# Patient Record
Sex: MALE | Race: WHITE | NOT HISPANIC OR LATINO | Employment: UNEMPLOYED | URBAN - METROPOLITAN AREA
[De-identification: names, ages, dates, MRNs, and addresses within clinical notes are randomized per-mention and may not be internally consistent; named-entity substitution may affect disease eponyms.]

---

## 2018-03-08 ENCOUNTER — APPOINTMENT (EMERGENCY)
Dept: RADIOLOGY | Facility: HOSPITAL | Age: 28
End: 2018-03-08
Payer: COMMERCIAL

## 2018-03-08 ENCOUNTER — HOSPITAL ENCOUNTER (EMERGENCY)
Facility: HOSPITAL | Age: 28
Discharge: HOME | End: 2018-03-08
Attending: EMERGENCY MEDICINE | Admitting: EMERGENCY MEDICINE
Payer: COMMERCIAL

## 2018-03-08 VITALS
TEMPERATURE: 97.5 F | BODY MASS INDEX: 26.61 KG/M2 | HEART RATE: 52 BPM | DIASTOLIC BLOOD PRESSURE: 70 MMHG | OXYGEN SATURATION: 99 % | HEIGHT: 70 IN | RESPIRATION RATE: 21 BRPM | WEIGHT: 185.85 LBS | SYSTOLIC BLOOD PRESSURE: 120 MMHG

## 2018-03-08 DIAGNOSIS — R06.00 DYSPNEA: ICD-10-CM

## 2018-03-08 DIAGNOSIS — R07.9 CHEST PAIN: Primary | ICD-10-CM

## 2018-03-08 LAB
ALBUMIN SERPL BCP-MCNC: 4.1 G/DL (ref 3.5–5)
ALP SERPL-CCNC: 63 U/L (ref 46–116)
ALT SERPL W P-5'-P-CCNC: 19 U/L (ref 12–78)
ANION GAP SERPL CALCULATED.3IONS-SCNC: 9 MMOL/L (ref 4–13)
AST SERPL W P-5'-P-CCNC: 10 U/L (ref 5–45)
ATRIAL RATE: 57 BPM
BASOPHILS # BLD AUTO: 0.04 THOUSANDS/ΜL (ref 0–0.1)
BASOPHILS NFR BLD AUTO: 1 % (ref 0–1)
BILIRUB SERPL-MCNC: 0.5 MG/DL (ref 0.2–1)
BUN SERPL-MCNC: 19 MG/DL (ref 5–25)
CALCIUM SERPL-MCNC: 8.8 MG/DL (ref 8.3–10.1)
CHLORIDE SERPL-SCNC: 100 MMOL/L (ref 100–108)
CO2 SERPL-SCNC: 27 MMOL/L (ref 21–32)
CREAT SERPL-MCNC: 1.23 MG/DL (ref 0.6–1.3)
DEPRECATED D DIMER PPP: <270 NG/ML (FEU) (ref 0–424)
EOSINOPHIL # BLD AUTO: 0.15 THOUSAND/ΜL (ref 0–0.61)
EOSINOPHIL NFR BLD AUTO: 2 % (ref 0–6)
ERYTHROCYTE [DISTWIDTH] IN BLOOD BY AUTOMATED COUNT: 12 % (ref 11.6–15.1)
GFR SERPL CREATININE-BSD FRML MDRD: 81 ML/MIN/1.73SQ M
GLUCOSE SERPL-MCNC: 127 MG/DL (ref 65–140)
HCT VFR BLD AUTO: 38.1 % (ref 36.5–49.3)
HGB BLD-MCNC: 13.1 G/DL (ref 12–17)
LYMPHOCYTES # BLD AUTO: 2.81 THOUSANDS/ΜL (ref 0.6–4.47)
LYMPHOCYTES NFR BLD AUTO: 42 % (ref 14–44)
MCH RBC QN AUTO: 29.2 PG (ref 26.8–34.3)
MCHC RBC AUTO-ENTMCNC: 34.4 G/DL (ref 31.4–37.4)
MCV RBC AUTO: 85 FL (ref 82–98)
MONOCYTES # BLD AUTO: 0.44 THOUSAND/ΜL (ref 0.17–1.22)
MONOCYTES NFR BLD AUTO: 7 % (ref 4–12)
NEUTROPHILS # BLD AUTO: 3.2 THOUSANDS/ΜL (ref 1.85–7.62)
NEUTS SEG NFR BLD AUTO: 48 % (ref 43–75)
NRBC BLD AUTO-RTO: 0 /100 WBCS
P AXIS: 81 DEGREES
PLATELET # BLD AUTO: 158 THOUSANDS/UL (ref 149–390)
PMV BLD AUTO: 9.7 FL (ref 8.9–12.7)
POTASSIUM SERPL-SCNC: 3.7 MMOL/L (ref 3.5–5.3)
PR INTERVAL: 158 MS
PROT SERPL-MCNC: 7.2 G/DL (ref 6.4–8.2)
QRS AXIS: 41 DEGREES
QRSD INTERVAL: 96 MS
QT INTERVAL: 412 MS
QTC INTERVAL: 401 MS
RBC # BLD AUTO: 4.49 MILLION/UL (ref 3.88–5.62)
SODIUM SERPL-SCNC: 136 MMOL/L (ref 136–145)
T WAVE AXIS: 52 DEGREES
TROPONIN I SERPL-MCNC: <0.02 NG/ML
VENTRICULAR RATE: 57 BPM
WBC # BLD AUTO: 6.65 THOUSAND/UL (ref 4.31–10.16)

## 2018-03-08 PROCEDURE — 36415 COLL VENOUS BLD VENIPUNCTURE: CPT | Performed by: EMERGENCY MEDICINE

## 2018-03-08 PROCEDURE — 85379 FIBRIN DEGRADATION QUANT: CPT | Performed by: EMERGENCY MEDICINE

## 2018-03-08 PROCEDURE — 85025 COMPLETE CBC W/AUTO DIFF WBC: CPT | Performed by: EMERGENCY MEDICINE

## 2018-03-08 PROCEDURE — 93005 ELECTROCARDIOGRAM TRACING: CPT | Performed by: EMERGENCY MEDICINE

## 2018-03-08 PROCEDURE — 80053 COMPREHEN METABOLIC PANEL: CPT | Performed by: EMERGENCY MEDICINE

## 2018-03-08 PROCEDURE — 99285 EMERGENCY DEPT VISIT HI MDM: CPT

## 2018-03-08 PROCEDURE — 96374 THER/PROPH/DIAG INJ IV PUSH: CPT

## 2018-03-08 PROCEDURE — 71046 X-RAY EXAM CHEST 2 VIEWS: CPT

## 2018-03-08 PROCEDURE — 93010 ELECTROCARDIOGRAM REPORT: CPT | Performed by: INTERNAL MEDICINE

## 2018-03-08 PROCEDURE — 84484 ASSAY OF TROPONIN QUANT: CPT | Performed by: EMERGENCY MEDICINE

## 2018-03-08 RX ORDER — 0.9 % SODIUM CHLORIDE 0.9 %
3 VIAL (ML) INJECTION AS NEEDED
Status: DISCONTINUED | OUTPATIENT
Start: 2018-03-08 | End: 2018-03-08 | Stop reason: HOSPADM

## 2018-03-08 RX ORDER — KETOROLAC TROMETHAMINE 30 MG/ML
15 INJECTION, SOLUTION INTRAMUSCULAR; INTRAVENOUS ONCE
Status: COMPLETED | OUTPATIENT
Start: 2018-03-08 | End: 2018-03-08

## 2018-03-08 RX ADMIN — KETOROLAC TROMETHAMINE 15 MG: 30 INJECTION, SOLUTION INTRAMUSCULAR at 03:15

## 2018-03-08 NOTE — DISCHARGE INSTRUCTIONS
Chest Pain   WHAT YOU NEED TO KNOW:   Chest pain can be caused by a range of conditions, from not serious to life-threatening  Chest pain can be a symptom of a digestive problem, such as acid reflux or a stomach ulcer  An anxiety attack or a strong emotion, such as anger, can also cause chest pain  Infection, inflammation, or a fracture in the bones or cartilage in your chest can cause pain or discomfort  Sometimes chest pain or pressure is caused by poor blood flow to your heart (angina)  Chest pain may also be caused by life-threatening conditions such as a heart attack or blood clot in your lungs  DISCHARGE INSTRUCTIONS:   Call 911 if:   · You have any of the following signs of a heart attack:      ¨ Squeezing, pressure, or pain in your chest that lasts longer than 5 minutes or returns    ¨ Discomfort or pain in your back, neck, jaw, stomach, or arm     ¨ Trouble breathing    ¨ Nausea or vomiting    ¨ Lightheadedness or a sudden cold sweat, especially with chest pain or trouble breathing    Return to the emergency department if:   · You have chest discomfort that gets worse, even with medicine  · You cough or vomit blood  · Your bowel movements are black or bloody  · You cannot stop vomiting, or it hurts to swallow  Contact your healthcare provider if:   · You have questions or concerns about your condition or care  Medicines:   · Medicines  may be given to treat the cause of your chest pain  Examples include pain medicine, anxiety medicine, or medicines to increase blood flow to your heart  · Do not take certain medicines without asking your healthcare provider first   These include NSAIDs, herbal or vitamin supplements, or hormones (estrogen or progestin)  · Take your medicine as directed  Contact your healthcare provider if you think your medicine is not helping or if you have side effects  Tell him or her if you are allergic to any medicine   Keep a list of the medicines, vitamins, and herbs you take  Include the amounts, and when and why you take them  Bring the list or the pill bottles to follow-up visits  Carry your medicine list with you in case of an emergency  Follow up with your healthcare provider within 72 hours, or as directed: You may need to return for more tests to find the cause of your chest pain  You may be referred to a specialist, such as a cardiologist or gastroenterologist  Write down your questions so you remember to ask them during your visits  Healthy living tips: The following are general healthy guidelines  If your chest pain is caused by a heart problem, your healthcare provider will give you specific guidelines to follow  · Do not smoke  Nicotine and other chemicals in cigarettes and cigars can cause lung and heart damage  Ask your healthcare provider for information if you currently smoke and need help to quit  E-cigarettes or smokeless tobacco still contain nicotine  Talk to your healthcare provider before you use these products  · Eat a variety of healthy, low-fat foods  Healthy foods include fruits, vegetables, whole-grain breads, low-fat dairy products, beans, lean meats, and fish  Ask for more information about a heart healthy diet  · Ask about activity  Your healthcare provider will tell you which activities to limit or avoid  Ask when you can drive, return to work, and have sex  Ask about the best exercise plan for you  · Maintain a healthy weight  Ask your healthcare provider how much you should weigh  Ask him or her to help you create a weight loss plan if you are overweight  · Get the flu and pneumonia vaccines  All adults should get the influenza (flu) vaccine  Get it every year as soon as it becomes available  The pneumococcal vaccine is given to adults aged 72 years or older  The vaccine is given every 5 years to prevent pneumococcal disease, such as pneumonia    © 2017 [facility] [place] Information is for End User's use only and may not be sold, redistributed or otherwise used for commercial purposes  All illustrations and images included in CareNotes® are the copyrighted property of [employer]  or [facility]  The above information is an  only  It is not intended as medical advice for individual conditions or treatments  Talk to your doctor, nurse or pharmacist before following any medical regimen to see if it is safe and effective for you

## 2018-03-08 NOTE — ED PROVIDER NOTES
History  Chief Complaint   Patient presents with    Shortness of Breath     Per PT " I woke with SOB with CP  The CP felt tight for awhile " No Hx of cardiac and respiratory  HPI  33 y o  male presents with 3 hours of left sided chest pain without radiation  Patient describes severe tightness that came on while attempting to sleep and continues in the ER though it has improved  Patient states breathing worsens the pain and nothing improves the pain  Patient denies exertional component to his chest pain  Patient has had multiple prior episodes of this in the past about evaluate without clear etiology identified  Patient states he met with a cardiologist few years ago who suggested that the chest pain was likely related to anxiety  Patient denies fever/chills, nausea/vomiting, diaphoresis, dyspnea, cough, hemoptysis, weakness, dizziness, back pain, syncope, focal weakness or numbness, leg pain or swelling  All other review of systems reviewed and noted to be negative  The patient denies a history of atherosclerotic disease (CAD/TIA/CVA/PAD)  Patient denies any history of hypertension, denies hyperlipidemia, denies diabetes; denies any early family history of CAD (male less than 51yo or female less than 71 yo)  The patient denies any use of tobacco in the past 90 days  The patient denies any use of illicit drugs, including cocaine  Patient denies any immobilization of at least 3 days or surgery in the past 4 weeks  Patient drive long distances, 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  Patient denies any history of DVT or PE  Patient denies any malignancy with treatment within the past 6 months  Objective  PHYSICAL EXAM:  Constitutional:  No acute distress  Eyes: No scleral icterus or erythema  HENT:  Head normocephalic and atraumatic  Pharynx moist without erythema or exudate  CV:  Normal inspection with no rash, signs of infection, or trauma  Regular rate and rhythm   No murmur  Peripheral pulses intact and equal   Respiratory:  Lungs clear to auscultation bilaterally without adventitious sounds  Abdomen:  Soft, non-tender, non-distended  Back:  No rash or signs of herpes zoster  Skin:  Normal color  Warm and Dry  Extremities:  Non-tender lower extremities without asymmetry; no clinical signs of DVT  No lower extremity edema  Neuro:  Alert  No gross motor deficits  Psych: Normal mood and affect  Medical Decision Making    HEART SCORE  History: 0   -Highly suspicious +2, Moderately suspicious +1   -Likelihood ratios: radiation to both arms (2 6), similar to prior ischemia (2 2), change in pattern over 24 hours (2 0)  EK (ST changes +2, Other abnormalities +1)  Age: 0 (greater 65 +2, 45-65 +1)  Risk factors: 0 (3 or more +2, 1-2 factors +1)  Troponin: 0 (greater than 3 times limit +2)  Total: 0    Patient presenting with chest pain  The patient's HEART score is 0  EKG obtained and reviewed independently by myself, which was sinus vini without any acute ST/T wave changes  CXR will be obtained to evaluate for alternative pathologies, including pneumothorax or pneumonia  CBC will be obtained to evaluate for leukocytosis suggestive of infectious process such as pneumonia or myocarditis  Rod Claros' Criteria for Pulmonary Embolism  no - clinical signs or symptoms of DVT (3)  no - PE most likely diagnosis (3)  no - Heart rate greater than 100 (1 5)  no - Immobilization at least 3 days OR surgery in the previous 4 weeks (1 5)  no - Previous, objectively diagnosed PE or DVT (1 5)  no - Hemoptysis (1)  no - Malignancy with treatment within 6 months or palliative (1)    Patient has a Wells' score of greater than 4 points, imaging will be obtained with a CT of the chest with PE protocol to evaluate for potential pulmonary embolism  Patient has a low risk Wells' criteria but at risk considering patient's routine of driving greater than 3 hours at a time    As such, a D-Dimer will be ordered for the patient to evaluate for the potential for pulmonary embolism  If positive, CT imaging of the patient's chest will be obtained to evaluate for potential pulmonary embolism  CXR obtained and reviewed independently by myself; this did not demonstrate any acute abnormalities  Labs obtained and reviewed and were essentially unremarkable  Initial troponin was negative  Patient declined to stay for delta troponin  Laboratory and radiographic findings were discussed with the patient  There is a broad differential and I considered emergent diagnoses including pericarditis, ACS, angina, PE, pneumonia, rib fracture, and PTX, but these appear less likely considering the data gathered thus far  I have instructed the patient to return to the ER at any time if there are any new or worsening symptoms  The patient expressed understanding of and agreement with this plan  Opportunity was given for questions prior to discharge and all stated questions were answered to the patient's satisfaction  Home care instructions provided  Stress test ordered for the patient  None       History reviewed  No pertinent past medical history  History reviewed  No pertinent surgical history  History reviewed  No pertinent family history  I have reviewed and agree with the history as documented      Social History   Substance Use Topics    Smoking status: Former Smoker    Smokeless tobacco: Never Used    Alcohol use Yes      Comment: occ        Review of Systems    Physical Exam  ED Triage Vitals   Temperature Pulse Respirations Blood Pressure SpO2   03/08/18 0251 03/08/18 0251 03/08/18 0251 03/08/18 0254 03/08/18 0251   97 5 °F (36 4 °C) 67 20 119/72 99 %      Temp Source Heart Rate Source Patient Position - Orthostatic VS BP Location FiO2 (%)   03/08/18 0251 03/08/18 0251 03/08/18 0251 03/08/18 0251 --   Oral Monitor Sitting Right arm       Pain Score       03/08/18 0251       3 Orthostatic Vital Signs  Vitals:    03/08/18 0251 03/08/18 0254 03/08/18 0345   BP:  119/72    Pulse: 67  (!) 51   Patient Position - Orthostatic VS: Sitting         Physical Exam    ED Medications  Medications   sodium chloride (PF) 0 9 % injection 3 mL (not administered)   ketorolac (TORADOL) injection 15 mg (15 mg Intravenous Given 3/8/18 0315)       Diagnostic Studies  Results Reviewed     Procedure Component Value Units Date/Time    Troponin I repeat in 3 hrs [[id number]]     Lab Status:  No result Specimen:  Blood     Troponin I [[id number]]  (Normal) Collected:  03/08/18 0310    Lab Status:  Final result Specimen:  Blood from Arm, Right Updated:  03/08/18 0337     Troponin I <0 02 ng/mL     Narrative:         [employer] Chemistry analyzer 99% cutoff is > 0 04 ng/mL in network labs    o cTnI 99% cutoff is useful only when applied to patients in the clinical setting of myocardial ischemia  o cTnI 99% cutoff should be interpreted in the context of clinical history, ECG findings and possibly cardiac imaging to establish correct diagnosis  o cTnI 99% cutoff may be suggestive but clearly not indicative of a coronary event without the clinical setting of myocardial ischemia  Comprehensive metabolic panel [[id number]] Collected:  03/08/18 0310    Lab Status:  Final result Specimen:  Blood from Arm, Right Updated:  03/08/18 0334     Sodium 136 mmol/L      Potassium 3 7 mmol/L      Chloride 100 mmol/L      CO2 27 mmol/L      Anion Gap 9 mmol/L      BUN 19 mg/dL      Creatinine 1 23 mg/dL      Glucose 127 mg/dL      Calcium 8 8 mg/dL      AST 10 U/L      ALT 19 U/L      Alkaline Phosphatase 63 U/L      Total Protein 7 2 g/dL      Albumin 4 1 g/dL      Total Bilirubin 0 50 mg/dL      eGFR 81 ml/min/1 73sq m     Narrative:         National Kidney Disease Education Program recommendations are as follows:  GFR calculation is accurate only with a steady state creatinine  Chronic Kidney disease less than 60 ml/min/1 73 sq  meters  Kidney failure less than 15 ml/min/1 73 sq  meters  D-dimer, quantitative [[id number]]  (Normal) Collected:  03/08/18 0310    Lab Status:  Final result Specimen:  Blood from Arm, Right Updated:  03/08/18 0333     D-Dimer, Quant <270 ng/ml (FEU)     CBC and differential [[id number]]  (Normal) Collected:  03/08/18 0310    Lab Status:  Final result Specimen:  Blood from Arm, Right Updated:  03/08/18 0318     WBC 6 65 Thousand/uL      RBC 4 49 Million/uL      Hemoglobin 13 1 g/dL      Hematocrit 38 1 %      MCV 85 fL      MCH 29 2 pg      MCHC 34 4 g/dL      RDW 12 0 %      MPV 9 7 fL      Platelets 964 Thousands/uL      nRBC 0 /100 WBCs      Neutrophils Relative 48 %      Lymphocytes Relative 42 %      Monocytes Relative 7 %      Eosinophils Relative 2 %      Basophils Relative 1 %      Neutrophils Absolute 3 20 Thousands/µL      Lymphocytes Absolute 2 81 Thousands/µL      Monocytes Absolute 0 44 Thousand/µL      Eosinophils Absolute 0 15 Thousand/µL      Basophils Absolute 0 04 Thousands/µL                  X-ray chest 2 views   ED Interpretation by Nuha Denis MD (03/08 0356)   No acute findings  Procedures  Procedures       Phone Contacts  ED Phone Contact    ED Course  ED Course as of Mar 08 0411   Thu Mar 08, 2018   0404 Discussed findings with the patient  Suggested delta troponin  After discussion of the risks and benefits, patient declined awaiting for delta troponin  Patient appears clear and competent in making medical decisions  Patient's pain has resolved  Patient has not had stress testing previously  Will order stress echocardiogram considering patient's age  Discussed follow-up with primary care regarding this                                  MDM  CritCare Time    Disposition  Final diagnoses:   Chest pain   Dyspnea     Time reflects when diagnosis was documented in both MDM as applicable and the Disposition within this note     Time User Action Codes Description Comment 3/8/2018  4:05 AM Yaima Lux Add [R07 9] Chest pain     3/8/2018  4:05 AM Yaima Lux Add [R06 00] Dyspnea       ED Disposition     ED Disposition Condition Comment    Discharge  Evelyne Rota discharge to home/self care  Condition at discharge: Stable        Follow-up Information     Follow up With Specialties Details Why Contact Info Additional Information    Primary care physician  Schedule an appointment as soon as possible for a visit in 1 week Follow-up and reassessment, discussed results of stress testing  [facility] Emergency Department Emergency Medicine Go to If symptoms worsen [address] [place] [address] ED, [address] [place] [address], [facility]        Patient's Medications    No medications on file       Outpatient Discharge Orders  Echo stress test w contrast if indicated   Standing Status: Future  Standing Exp   Date: 03/08/22         ED Provider  Electronically Signed by           Virgil Ibrahim MD  03/08/18 9313

## 2018-03-16 ENCOUNTER — HOSPITAL ENCOUNTER (OUTPATIENT)
Dept: NON INVASIVE DIAGNOSTICS | Facility: HOSPITAL | Age: 28
Discharge: HOME | End: 2018-03-16
Attending: EMERGENCY MEDICINE
Payer: COMMERCIAL

## 2018-03-16 DIAGNOSIS — R07.9 CHEST PAIN: ICD-10-CM

## 2018-03-16 LAB
CHEST PAIN STATEMENT: NORMAL
MAX DIASTOLIC BP: 50 MMHG
MAX HEART RATE: 176 BPM
MAX PREDICTED HEART RATE: 194 BPM
MAX. SYSTOLIC BP: 150 MMHG
PROTOCOL NAME: NORMAL
TARGET HR FORMULA: NORMAL
TEST INDICATION: NORMAL
TIME IN EXERCISE PHASE: NORMAL

## 2018-03-16 PROCEDURE — 93351 STRESS TTE COMPLETE: CPT | Performed by: INTERNAL MEDICINE

## 2018-03-16 PROCEDURE — 93350 STRESS TTE ONLY: CPT

## 2021-04-01 DIAGNOSIS — Z23 ENCOUNTER FOR IMMUNIZATION: ICD-10-CM

## 2021-04-06 ENCOUNTER — IMMUNIZATIONS (OUTPATIENT)
Dept: FAMILY MEDICINE CLINIC | Facility: HOSPITAL | Age: 31
End: 2021-04-06

## 2021-04-06 DIAGNOSIS — Z23 ENCOUNTER FOR IMMUNIZATION: Primary | ICD-10-CM

## 2021-04-06 PROCEDURE — 0001A SARS-COV-2 / COVID-19 MRNA VACCINE (PFIZER-BIONTECH) 30 MCG: CPT

## 2021-04-06 PROCEDURE — 91300 SARS-COV-2 / COVID-19 MRNA VACCINE (PFIZER-BIONTECH) 30 MCG: CPT

## 2021-04-27 ENCOUNTER — IMMUNIZATIONS (OUTPATIENT)
Dept: FAMILY MEDICINE CLINIC | Facility: HOSPITAL | Age: 31
End: 2021-04-27

## 2021-04-27 DIAGNOSIS — Z23 ENCOUNTER FOR IMMUNIZATION: Primary | ICD-10-CM

## 2021-04-27 PROCEDURE — 0002A SARS-COV-2 / COVID-19 MRNA VACCINE (PFIZER-BIONTECH) 30 MCG: CPT

## 2021-04-27 PROCEDURE — 91300 SARS-COV-2 / COVID-19 MRNA VACCINE (PFIZER-BIONTECH) 30 MCG: CPT

## 2022-03-07 ENCOUNTER — OFFICE VISIT (OUTPATIENT)
Dept: URGENT CARE | Age: 32
End: 2022-03-07
Payer: COMMERCIAL

## 2022-03-07 VITALS
OXYGEN SATURATION: 98 % | SYSTOLIC BLOOD PRESSURE: 131 MMHG | DIASTOLIC BLOOD PRESSURE: 78 MMHG | RESPIRATION RATE: 18 BRPM | HEART RATE: 78 BPM | TEMPERATURE: 97.7 F

## 2022-03-07 DIAGNOSIS — R06.02 SHORTNESS OF BREATH: ICD-10-CM

## 2022-03-07 DIAGNOSIS — R07.9 CHEST PAIN, UNSPECIFIED TYPE: Primary | ICD-10-CM

## 2022-03-07 LAB
ATRIAL RATE: 62 BPM
P AXIS: 65 DEGREES
PR INTERVAL: 146 MS
QRS AXIS: 37 DEGREES
QRSD INTERVAL: 94 MS
QT INTERVAL: 412 MS
QTC INTERVAL: 418 MS
T WAVE AXIS: 40 DEGREES
VENTRICULAR RATE: 62 BPM

## 2022-03-07 PROCEDURE — 99212 OFFICE O/P EST SF 10 MIN: CPT

## 2022-03-07 PROCEDURE — 93010 ELECTROCARDIOGRAM REPORT: CPT

## 2022-03-07 PROCEDURE — 93005 ELECTROCARDIOGRAM TRACING: CPT

## 2022-03-07 NOTE — PROGRESS NOTES
[address] [facility]        NAME: Syeda Milton is a 28 y o  male  : 1990    MRN: [id number]  DATE: 2022  TIME: 7:17 PM    Assessment and Plan   Chest pain, unspecified type [R07 9]  1  Chest pain, unspecified type     2  Shortness of breath        EKG reviewed, normal sinus rhythm noted  Recommended to patient that he go to the ER for further workup and evaluation, to rule out cardiac related chest pain  Patient declined at this time  He states that he will make an appointment with his PCP  AMA form signed  All questions and concerns were addressed at this time  Patient Instructions       Follow up with PCP in 3-5 days  Proceed to  ER if symptoms worsen  Chief Complaint     Chief Complaint   Patient presents with    Chest Pain     intermittent pressure x 2 days, non radiating, -n/v, +sob    Shortness of Breath         History of Present Illness       Patient presenting for evaluation pain over the past 2 days  Patient states that the chest pain has been intermittent, but describes as pressure-like sensation in his left upper chest   He also states that he had mild shortness of breath today, and some tingling in his left upper extremity  He denies a cardiac history, but states that his uncle suffered from an MI  He denies any current treatment for his symptoms  He denies denies increased anxiety or stress  Chest Pain   Associated symptoms include numbness and shortness of breath  Pertinent negatives include no abdominal pain, back pain, cough, fever, headaches, palpitations or vomiting  Shortness of Breath  Associated symptoms include chest pain  Pertinent negatives include no abdominal pain, ear pain, fever, headaches, rash, sore throat or vomiting  Review of Systems   Review of Systems   Constitutional: Negative for chills and fever  HENT: Negative for ear pain and sore throat  Eyes: Negative for pain and visual disturbance     Respiratory: Positive for shortness of breath  Negative for cough  Cardiovascular: Positive for chest pain  Negative for palpitations  Gastrointestinal: Negative for abdominal pain and vomiting  Genitourinary: Negative for dysuria and hematuria  Musculoskeletal: Negative for arthralgias and back pain  Skin: Negative for color change and rash  Neurological: Positive for numbness  Negative for syncope and headaches  All other systems reviewed and are negative  Current Medications     No current outpatient medications on file  Current Allergies     Allergies as of 03/07/2022    (No Known Allergies)            The following portions of the patient's history were reviewed and updated as appropriate: allergies, current medications, past family history, past medical history, past social history, past surgical history and problem list      History reviewed  No pertinent past medical history  History reviewed  No pertinent surgical history  History reviewed  No pertinent family history  Medications have been verified  Objective   /78   Pulse 78   Temp 97 7 °F (36 5 °C)   Resp 18   SpO2 98%        Physical Exam     Physical Exam  Vitals and nursing note reviewed  Constitutional:       General: He is not in acute distress  Appearance: Normal appearance  He is not ill-appearing  HENT:      Head: Normocephalic and atraumatic  Right Ear: Tympanic membrane normal       Left Ear: Tympanic membrane normal       Nose: Nose normal  No congestion or rhinorrhea  Mouth/Throat:      Mouth: Mucous membranes are moist       Pharynx: Oropharynx is clear  No oropharyngeal exudate or posterior oropharyngeal erythema  Eyes:      Extraocular Movements: Extraocular movements intact  Conjunctiva/sclera: Conjunctivae normal       Pupils: Pupils are equal, round, and reactive to light  Cardiovascular:      Rate and Rhythm: Normal rate and regular rhythm  Pulses: Normal pulses        Heart sounds: Normal heart sounds  No murmur heard  No friction rub  No gallop  Pulmonary:      Effort: Pulmonary effort is normal  No respiratory distress  Breath sounds: Normal breath sounds  No stridor  No wheezing, rhonchi or rales  Abdominal:      General: Bowel sounds are normal       Palpations: Abdomen is soft  Tenderness: There is no abdominal tenderness  Musculoskeletal:         General: Normal range of motion  Cervical back: Normal range of motion and neck supple  Skin:     General: Skin is warm and dry  Capillary Refill: Capillary refill takes less than 2 seconds  Neurological:      General: No focal deficit present  Mental Status: He is alert and oriented to person, place, and time     Psychiatric:         Mood and Affect: Mood normal          Behavior: Behavior normal

## 2022-03-08 NOTE — PATIENT INSTRUCTIONS
Chest Pain   AMBULATORY CARE:   Chest pain  can be caused by a range of conditions, from not serious to life-threatening  It may be caused by a heart attack or a blood clot in your lungs  Sometimes chest pain or pressure is caused by poor blood flow to your heart (angina)  Infection, inflammation, or a fracture in the bones or cartilage in your chest can cause pain or discomfort  Chest pain can also be a symptom of a digestive problem, such as acid reflux or a stomach ulcer  An anxiety attack or a strong emotion such as anger can also cause chest pain  It is important to follow up with your healthcare provider to find the cause of your chest pain  Common symptoms you may have with chest pain:   · Fever or sweating    · Nausea or vomiting    · Shortness of breath    · Discomfort or pressure that spreads from your chest to your back, jaw, or arm    · A racing or slow heartbeat    · Feeling weak, tired, or faint    Call your local emergency number (911 in the [address] [place] [address],[address]) or have someone call if:   · You have any of the following signs of a heart attack:      ? Squeezing, pressure, or pain in your chest    ? You may  also have any of the following:     § Discomfort or pain in your back, neck, jaw, stomach, or arm    § Shortness of breath    § Nausea or vomiting    § Lightheadedness or a sudden cold sweat      Seek care immediately if:   · You have chest discomfort that gets worse, even with medicine  · You cough or vomit blood  · Your bowel movements are black or bloody  · You cannot stop vomiting, or it hurts to swallow  Call your doctor if:   · You have questions or concerns about your condition or care  Treatment for chest pain  may include medicine to treat your symptoms while your healthcare provider finds the cause of your chest pain  · Medicines  may be given to treat the cause of your chest pain   Examples include pain medicine, anxiety medicine, or medicines to increase blood flow to your heart     · Do not take certain medicines without asking your healthcare provider first   These include NSAIDs, herbal or vitamin supplements, or hormones (estrogen or progestin)  · One or more stents  may need to be placed in your heart if pain was caused by blockage  A stent is a wire mesh tube that helps hold your artery open  Healthy living tips: The following are general healthy guidelines  If the cause of your chest pain is known, your healthcare provider will give you specific guidelines to follow  · Do not smoke  Nicotine and other chemicals in cigarettes and cigars can cause lung and heart damage  Ask your healthcare provider for information if you currently smoke and need help to quit  E-cigarettes or smokeless tobacco still contain nicotine  Talk to your healthcare provider before you use these products  · Choose a variety of healthy foods as often as possible  Include fresh, frozen, or canned fruits and vegetables  Also include low-fat dairy products, fish, chicken (without skin), and lean meats  Your healthcare provider or a dietitian can help you create meal plans  You may need to avoid certain foods or drinks if your pain is caused by a digestion problem  · Lower your sodium (salt) intake  Limit foods that are high in sodium, such as canned foods, salty snacks, and cold cuts  If you add salt when you cook food, do not add more at the table  Choose low-sodium canned foods as much as possible  · Drink plenty of water every day  Water helps your body to control your temperature and blood pressure  Ask your healthcare provider how much water you should drink every day  · Ask about activity  Your healthcare provider will tell you which activities to limit or avoid  Ask when you can drive, return to work, and have sex  Ask about the best exercise plan for you  · Maintain a healthy weight  Ask your healthcare provider what a healthy weight is for you   Ask him or her to help you create a safe weight loss plan if you are overweight  · Ask about vaccines you may need  Get the influenza (flu) vaccine every year as soon as recommended, usually in September or October  You may also need a pneumococcal vaccine to prevent pneumonia  The vaccine is usually given every 5 years, starting at age 72  Your healthcare provider can tell you if should get other vaccines, and when to get them  Follow up with your healthcare provider within 72 hours, or as directed: You may need to return for more tests to find the cause of your chest pain  You may be referred to a specialist, such as a cardiologist or gastroenterologist  Write down your questions so you remember to ask them during your visits  © Copyright [employer] 2022 Information is for End User's use only and may not be sold, redistributed or otherwise used for commercial purposes  All illustrations and images included in CareNotes® are the copyrighted property of A D A M , Inc  or Krsiti Drummond   The above information is an  only  It is not intended as medical advice for individual conditions or treatments  Talk to your doctor, nurse or pharmacist before following any medical regimen to see if it is safe and effective for you

## 2022-03-09 LAB — HCV AB SER-ACNC: NEGATIVE

## 2022-05-24 ENCOUNTER — APPOINTMENT (OUTPATIENT)
Dept: LAB | Age: 32
End: 2022-05-24
Payer: COMMERCIAL

## 2022-05-24 ENCOUNTER — OFFICE VISIT (OUTPATIENT)
Dept: URGENT CARE | Age: 32
End: 2022-05-24
Payer: COMMERCIAL

## 2022-05-24 VITALS — OXYGEN SATURATION: 99 % | HEART RATE: 66 BPM | RESPIRATION RATE: 16 BRPM | TEMPERATURE: 97.8 F

## 2022-05-24 DIAGNOSIS — R05.9 COUGH: Primary | ICD-10-CM

## 2022-05-24 DIAGNOSIS — U07.1 ACUTE COVID-19: ICD-10-CM

## 2022-05-24 LAB
SARS-COV-2 AG UPPER RESP QL IA: POSITIVE
VALID CONTROL: ABNORMAL

## 2022-05-24 PROCEDURE — 87811 SARS-COV-2 COVID19 W/OPTIC: CPT | Performed by: NURSE PRACTITIONER

## 2022-05-24 PROCEDURE — 99213 OFFICE O/P EST LOW 20 MIN: CPT | Performed by: NURSE PRACTITIONER

## 2022-05-24 NOTE — PROGRESS NOTES
[facility]        NAME: Merline Currier is a 28 y o  male  : 1990    MRN: [id number]  DATE: May 24, 2022  TIME: 6:25 PM    Assessment and Plan   Cough [R05 9]  1  Cough     2  Close exposure to COVID-19 virus  Poct Covid 19 Rapid Antigen Test         Patient Instructions     Rapid covid at the clinic is positive  Stay quarantined for one day, then masking for 5 days after quarantine  Cough med OTC PRN  Follow up with PCP in 3-5 days  Proceed to  ER if symptoms worsen  Chief Complaint     Chief Complaint   Patient presents with    Cough     Cough for 4 days, wife was positive for Covid         History of Present Illness       HPI   Reports wife is positive for covid  Only symptom includes cough  Duration of symptoms x 4 days  No chronic medical conditions  Review of Systems   Review of Systems   Constitutional: Negative for chills and fever  HENT: Negative for congestion and sore throat  Respiratory: Positive for cough  Negative for chest tightness, shortness of breath and wheezing  Cardiovascular: Negative for chest pain  Gastrointestinal: Negative for diarrhea and vomiting  Neurological: Negative for headaches  Current Medications     No current outpatient medications on file  Current Allergies     Allergies as of 2022 - Reviewed 2022   Allergen Reaction Noted    Cat hair extract Itching and Eye Swelling 2022            The following portions of the patient's history were reviewed and updated as appropriate: allergies, current medications, past family history, past medical history, past social history, past surgical history and problem list      History reviewed  No pertinent past medical history  History reviewed  No pertinent surgical history  History reviewed  No pertinent family history  Medications have been verified  Objective   Pulse 66   Temp 97 8 °F (36 6 °C)   Resp 16   SpO2 99%   No LMP for male patient  Physical Exam     Physical Exam  Constitutional:       Appearance: He is not ill-appearing or diaphoretic  HENT:      Right Ear: Tympanic membrane normal       Left Ear: Tympanic membrane normal       Nose: Rhinorrhea present  Mouth/Throat:      Mouth: Mucous membranes are moist       Pharynx: No posterior oropharyngeal erythema  Cardiovascular:      Rate and Rhythm: Regular rhythm  Heart sounds: Normal heart sounds  Pulmonary:      Effort: Pulmonary effort is normal       Breath sounds: Normal breath sounds  No wheezing  Lymphadenopathy:      Cervical: No cervical adenopathy

## 2022-11-01 ENCOUNTER — APPOINTMENT (OUTPATIENT)
Dept: LAB | Facility: CLINIC | Age: 32
End: 2022-11-01

## 2022-11-01 ENCOUNTER — OFFICE VISIT (OUTPATIENT)
Dept: FAMILY MEDICINE CLINIC | Facility: CLINIC | Age: 32
End: 2022-11-01

## 2022-11-01 ENCOUNTER — TELEPHONE (OUTPATIENT)
Dept: ADMINISTRATIVE | Facility: OTHER | Age: 32
End: 2022-11-01

## 2022-11-01 VITALS
SYSTOLIC BLOOD PRESSURE: 110 MMHG | HEART RATE: 88 BPM | DIASTOLIC BLOOD PRESSURE: 60 MMHG | OXYGEN SATURATION: 100 % | WEIGHT: 195.38 LBS | TEMPERATURE: 98 F | BODY MASS INDEX: 28.94 KG/M2 | HEIGHT: 69 IN

## 2022-11-01 DIAGNOSIS — E55.9 VITAMIN D DEFICIENCY: ICD-10-CM

## 2022-11-01 DIAGNOSIS — Z76.89 ENCOUNTER TO ESTABLISH CARE: Primary | ICD-10-CM

## 2022-11-01 DIAGNOSIS — Z11.4 SCREENING FOR HIV (HUMAN IMMUNODEFICIENCY VIRUS): ICD-10-CM

## 2022-11-01 DIAGNOSIS — Z23 ENCOUNTER FOR IMMUNIZATION: ICD-10-CM

## 2022-11-01 LAB — 25(OH)D3 SERPL-MCNC: 40 NG/ML (ref 30–100)

## 2022-11-01 NOTE — PROGRESS NOTES
Name: Olman Aguilera      : 1990      MRN: [id number]  Encounter Provider: Juan uRiz DO  Encounter Date: 2022   Encounter department: [address]     1  Encounter to establish care  Assessment & Plan:  Patient is a 58-year-old male with no significant past medical history presenting to establish care  Patient has no acute concerns today  No Fam Hx of CAD, DM , HTN, will hold on ordering any screening blood work at this time  HIV screening has been ordered      2  Encounter for immunization  -     influenza vaccine, quadrivalent, 0 5 mL, preservative-free, for adult and pediatric patients 6 mos+ (AFLURIA, FLUARIX, FLULAVAL, FLUZONE)    3  Screening for HIV (human immunodeficiency virus)  -     HIV 1/2 Antigen/Antibody (4th Generation) w Reflex [facility]; Future    4  Vitamin D deficiency  Assessment & Plan:  Continue vitamin-D supplementation  Will check vitamin-D level    Orders:  -     Vitamin D 25 hydroxy; Future         Subjective      Patient presents to establish care  Has no acute complaints or concerns today  Patient does have a past medical history of chest pain in 2018 which yielded a full cardiac workup that was negative  Patient reports that he no longer has any chest pains  Patient is not a smoker and has never smoked  A  baby at home        Review of Systems   Constitutional: Negative for chills and fever  HENT: Negative for congestion  Respiratory: Negative for cough and shortness of breath  Cardiovascular: Negative for chest pain and palpitations  Gastrointestinal: Negative for abdominal pain, constipation and diarrhea  Neurological: Negative for dizziness and headaches         Current Outpatient Medications on File Prior to Visit   Medication Sig   • Cholecalciferol (VITAMIN D3 GUMMIES ADULT PO) Take 1 Units by mouth daily       Objective     /60 (BP Location: Left arm, Patient Position: Sitting, Cuff Size: Large)   Pulse 88   Temp 98 °F (36 7 °C) (Temporal)   Ht 5' 9" (1 753 m)   Wt 88 6 kg (195 lb 6 oz)   SpO2 100%   BMI 28 85 kg/m²     Physical Exam  Vitals reviewed  Constitutional:       Appearance: Normal appearance  HENT:      Head: Normocephalic and atraumatic  Right Ear: Tympanic membrane normal       Left Ear: Tympanic membrane normal       Mouth/Throat:      Mouth: Mucous membranes are moist    Eyes:      Extraocular Movements: Extraocular movements intact  Pupils: Pupils are equal, round, and reactive to light  Cardiovascular:      Rate and Rhythm: Normal rate and regular rhythm  Heart sounds: Normal heart sounds  Pulmonary:      Effort: Pulmonary effort is normal       Breath sounds: Normal breath sounds  Neurological:      General: No focal deficit present  Mental Status: He is alert and oriented to person, place, and time  Psychiatric:         Mood and Affect: Mood normal          Behavior: Behavior normal           Coty Hawk DO     BMI Counseling: Body mass index is 28 85 kg/m²  The BMI is above normal  Exercise recommendations include moderate aerobic physical activity for 150 minutes/week, vigorous aerobic physical activity for 75 minutes/week and exercising 3-5 times per week

## 2022-11-01 NOTE — TELEPHONE ENCOUNTER
----- Message from Troy Lake MA sent at 10/31/2022 12:59 PM EDT -----  Regarding: Hep C  10/31/22 12:59 PM    Hello, our patient attached above has had Hepatitis C completed/performed  Please assist in updating the patient chart by pulling the Care Everywhere (CE) document  The date of service is 3/9/2022       Thank you,  Shonda Kim [facility]

## 2022-11-01 NOTE — ASSESSMENT & PLAN NOTE
Patient is a 78-year-old male with no significant past medical history presenting to establish care  Patient has no acute concerns today  No Fam Hx of CAD, DM , HTN, will hold on ordering any screening blood work at this time  HIV screening has been ordered

## 2022-11-01 NOTE — TELEPHONE ENCOUNTER
----- Message from Vivienne Duong MA sent at 11/1/2022  9:26 AM EDT -----  Regarding: Hep C  11/01/22 9:26 AM    Hello, our patient attached above has had Hepatitis C completed/performed  Please assist in updating the patient chart by pulling the Care Everywhere (CE) document  The date of service is 3/9/22       Thank you,  Shonda Kim  [facility]

## 2022-11-01 NOTE — TELEPHONE ENCOUNTER
----- Message from Briana Simmons MA sent at 11/1/2022  9:26 AM EDT -----  Regarding: Hep C  11/01/22 9:26 AM    Hello, our patient attached above has had Hepatitis C completed/performed  Please assist in updating the patient chart by pulling the Care Everywhere (CE) document  The date of service is 3/9/22       Thank you,  Shonda Kim [facility]

## 2022-11-01 NOTE — TELEPHONE ENCOUNTER
----- Message from Bruce Knowles MA sent at 11/1/2022  9:26 AM EDT -----  Regarding: Hep C  11/01/22 9:26 AM    Hello, our patient attached above has had Hepatitis C completed/performed  Please assist in updating the patient chart by pulling the Care Everywhere (CE) document  The date of service is 3/9/22       Thank you,  Shonda Kim [facility]

## 2022-11-02 ENCOUNTER — TELEPHONE (OUTPATIENT)
Dept: FAMILY MEDICINE CLINIC | Facility: CLINIC | Age: 32
End: 2022-11-02

## 2022-11-02 LAB — HIV 1+2 AB+HIV1 P24 AG SERPL QL IA: NORMAL

## 2022-11-02 NOTE — TELEPHONE ENCOUNTER
Left a detailed message on machine informing patient and to call back if he has questions        ----- Message from Mumtaz Jaeger DO sent at 11/2/2022 12:02 PM EDT -----  HIV Neg  Vit D low normal, would recommend OTC Vit D supplements 400IU daily

## 2022-11-02 NOTE — TELEPHONE ENCOUNTER
Upon review of the In Basket request we were able to locate, review, and update the patient chart as requested for Hepatitis C   Any additional questions or concerns should be emailed to the Practice Liaisons via the appropriate education email address, please do not reply via In Basket      Thank you  Russel Escudero

## 2023-05-25 ENCOUNTER — OFFICE VISIT (OUTPATIENT)
Dept: FAMILY MEDICINE CLINIC | Facility: CLINIC | Age: 33
End: 2023-05-25

## 2023-05-25 VITALS
HEIGHT: 69 IN | BODY MASS INDEX: 29.86 KG/M2 | OXYGEN SATURATION: 98 % | DIASTOLIC BLOOD PRESSURE: 84 MMHG | SYSTOLIC BLOOD PRESSURE: 124 MMHG | WEIGHT: 201.6 LBS | HEART RATE: 84 BPM

## 2023-05-25 DIAGNOSIS — H61.23 BILATERAL IMPACTED CERUMEN: Primary | ICD-10-CM

## 2023-05-25 NOTE — PROGRESS NOTES
Answers for HPI/ROS submitted by the patient on 5/24/2023  Affected ear: left  Chronicity: new  Onset: in the past 7 days  Progression since onset: waxing and waning  Frequency: every few hours  Fever: no fever  Pain - numeric: 3/10  abdominal pain: No  ear discharge: Yes  rash: No  cough: No  headaches: No  rhinorrhea: Yes  diarrhea: No  hearing loss: Yes  sore throat: No  neck pain: No  vomiting: No    Assessment/Plan:       Problem List Items Addressed This Visit    None  Visit Diagnoses     Bilateral impacted cerumen    -  Primary          1  Bilateral impacted cerumen  Removed without complication, patient felt relief, follow up as needed and for yearly physical         Ear cerumen removal    Date/Time: 5/25/2023 9:00 AM    Performed by: Kieran Booker MD  Authorized by: Kieran Booker MD  Universal Protocol:  Timeout called at: 5/25/2023 9:30 AM   Required items: required blood products, implants, devices, and special equipment available      Procedure details:     Location:  L ear and R ear    Procedure type: irrigation with instrumentation      Instrumentation: curette      Approach:  External  Post-procedure details:     Complication:  None    Hearing quality:  Improved    Patient tolerance of procedure: Tolerated well, no immediate complications  Comments:      Cerumen removed with no complications, TM normal after procedure, no perforation      Subjective:      Patient ID: Floridalma Harrison is a 33 y o  male  HPI    44-year-old male with no significant past medical history presenting today for 2 weeks feeling specific ear is clogged worse on the left he is intermittently having trouble hearing he has a discomfort and he feels as if it is clogged  He has otherwise been feeling well he did have a physical at a [facility] in the [place] [address] but he has recently moved to this area      The following portions of the patient's history were reviewed and updated as appropriate: allergies, "current medications, past family history, past medical history, past social history, past surgical history and problem list       Current Outpatient Medications:   •  Cholecalciferol (VITAMIN D3 GUMMIES ADULT PO), Take 1 Units by mouth daily, Disp: , Rfl:      Review of Systems   Constitutional: Negative for activity change and appetite change  HENT: Positive for ear discharge, ear pain, hearing loss and rhinorrhea  Negative for sore throat  Respiratory: Negative for cough  Cardiovascular: Negative for chest pain and palpitations  Gastrointestinal: Negative for diarrhea and vomiting  Musculoskeletal: Negative for neck pain  Skin: Negative for rash  Neurological: Negative for headaches  Objective:      /84   Pulse 84   Ht 5' 9\" (1 753 m)   Wt 91 4 kg (201 lb 9 6 oz)   SpO2 98%   BMI 29 77 kg/m²          Physical Exam  Constitutional:       Appearance: Normal appearance  HENT:      Right Ear: There is impacted cerumen  Left Ear: There is impacted cerumen  Cardiovascular:      Rate and Rhythm: Normal rate and regular rhythm  Pulmonary:      Effort: Pulmonary effort is normal       Breath sounds: Normal breath sounds  Neurological:      Mental Status: He is alert             Nettie Constantino MD  "

## 2023-10-01 ENCOUNTER — OFFICE VISIT (OUTPATIENT)
Dept: URGENT CARE | Facility: MEDICAL CENTER | Age: 33
End: 2023-10-01
Payer: COMMERCIAL

## 2023-10-01 VITALS
RESPIRATION RATE: 20 BRPM | DIASTOLIC BLOOD PRESSURE: 83 MMHG | WEIGHT: 200 LBS | BODY MASS INDEX: 28.63 KG/M2 | HEIGHT: 70 IN | OXYGEN SATURATION: 96 % | TEMPERATURE: 98.5 F | HEART RATE: 64 BPM | SYSTOLIC BLOOD PRESSURE: 138 MMHG

## 2023-10-01 DIAGNOSIS — L03.012 PARONYCHIA OF LEFT RING FINGER: Primary | ICD-10-CM

## 2023-10-01 PROCEDURE — 99213 OFFICE O/P EST LOW 20 MIN: CPT | Performed by: FAMILY MEDICINE

## 2023-10-01 RX ORDER — CEPHALEXIN 500 MG/1
500 CAPSULE ORAL EVERY 12 HOURS SCHEDULED
Qty: 14 CAPSULE | Refills: 0 | Status: SHIPPED | OUTPATIENT
Start: 2023-10-01 | End: 2023-10-08

## 2023-10-01 NOTE — PATIENT INSTRUCTIONS
Patient started empirically on cephalexin 500 mg twice a day for 7 days. Also recommended he soak affected finger in warm water for 15 to 30 minutes as needed. Paronychia   WHAT YOU NEED TO KNOW:   Paronychia is an infection of your nail fold caused by bacteria or a fungus. The nail fold is the skin around your nail. Paronychia may happen suddenly and last for 6 weeks or longer. You may have paronychia on more than 1 finger or toe. DISCHARGE INSTRUCTIONS:   Return to the emergency department if:   You have severe nail pain. The inflammation spreads to your hand or arm. Call your doctor if:   Your nail becomes loose, deformed, or falls off. You have a large abscess on your nail. You have questions or concerns about your condition or care. Medicines:   Td vaccine  is a booster shot used to help prevent tetanus and diphtheria. The Td booster may be given to adolescents and adults every 10 years or for certain wounds and injuries. Antibiotics: This medicine will help fight or prevent an infection. It may be given as a pill, cream, or ointment. Steroids: This medicine will help decrease inflammation. It may be given as a pill, cream, or ointment. Antifungal medicine: This medicine helps kill fungus that may be causing your infection. It may be given as a cream or ointment. NSAIDs:  These medicines decrease pain and swelling. NSAIDs are available without a doctor's order. Ask your healthcare provider which medicine is right for you. Ask how much to take and when to take it. Take as directed. NSAIDs can cause stomach bleeding and kidney problems if not taken correctly. Take your medicine as directed. Contact your healthcare provider if you think your medicine is not helping or if you have side effects. Tell your provider if you are allergic to any medicine. Keep a list of the medicines, vitamins, and herbs you take. Include the amounts, and when and why you take them.  Bring the list or the pill bottles to follow-up visits. Carry your medicine list with you in case of an emergency. Self-care:   Soak your nail:  Soak your nail in a mixture of equal parts vinegar and water 3 or 4 times each day. This will help decrease inflammation. Apply a warm compress:  Soak a washcloth in warm water and place it on your nail. This will help decrease inflammation. Elevate:  Raise your nail above the level of your heart as often as you can. This will help decrease swelling and pain. Prop your nail on pillows or blankets to keep it elevated comfortably. Use lotion:  Apply lotion after you wash your hands. This will prevent your skin from becoming too dry. Prevent paronychia:   Avoid chemicals and allergens that may harm your skin and nails. This includes soaps, laundry detergents, and nail products. Keep your nails clean and dry. Avoid soaking your nails in water. Use cotton-lined rubber gloves or wear 2 rubber gloves if you work with food or water. The gloves will help protect your nail folds. Keep your nails short. Do not bite your nails, pick at your hangnails, suck your fingers, or wear fake nails. Bring your own nail tools when you go to the nail salon. Follow up with your doctor as directed:  Write down your questions so you remember to ask them during your visits. © Copyright [employer] 2023 Information is for End User's use only and may not be sold, redistributed or otherwise used for commercial purposes. The above information is an  only. It is not intended as medical advice for individual conditions or treatments. Talk to your doctor, nurse or pharmacist before following any medical regimen to see if it is safe and effective for you.

## 2023-10-01 NOTE — PROGRESS NOTES
[facility]        NAME: Harjinder Juarez is a 29 y.o. male  : 1990    MRN: [id number]  DATE: 2023  TIME: 12:41 PM    Assessment and Plan   Paronychia of left ring finger [L03.012]  1. Paronychia of left ring finger  cephalexin (KEFLEX) 500 mg capsule            Patient Instructions       Follow up with PCP in 3-5 days. Proceed to  ER if symptoms worsen. Chief Complaint     Chief Complaint   Patient presents with   • Hand Pain     Patient states he has had pain and swelling in his 4th finger around the nailbed         History of Present Illness       27-year-old male here today with complaint of left fourth finger redness and swelling for the last 2 weeks. He believes that he must of cut a hangnail about 2 weeks ago and is slowly become more tender to touch. He had some drainage about a week ago. However redness and swelling on the lateral aspect is only developed in the last week or so. Decided to come to urgent care to have it evaluated. Review of Systems   Review of Systems   Skin: Positive for wound. Current Medications       Current Outpatient Medications:   •  cephalexin (KEFLEX) 500 mg capsule, Take 1 capsule (500 mg total) by mouth every 12 (twelve) hours for 7 days, Disp: 14 capsule, Rfl: 0  •  Cholecalciferol (VITAMIN D3 GUMMIES ADULT PO), Take 1 Units by mouth daily, Disp: , Rfl:     Current Allergies     Allergies as of 10/01/2023 - Reviewed 10/01/2023   Allergen Reaction Noted   • Cat hair extract Itching and Eye Swelling 2022            The following portions of the patient's history were reviewed and updated as appropriate: allergies, current medications, past family history, past medical history, past social history, past surgical history and problem list.     History reviewed. No pertinent past medical history.     Past Surgical History:   Procedure Laterality Date   • TONSILLECTOMY         Family History   Problem Relation Age of Onset   • Breast cancer Maternal Grandmother          Medications have been verified. Objective   /83   Pulse 64   Temp 98.5 °F (36.9 °C)   Resp 20   Ht 5' 10" (1.778 m)   Wt 90.7 kg (200 lb)   SpO2 96%   BMI 28.70 kg/m²   No LMP for male patient. Physical Exam     Physical Exam  Skin:     Comments: Left hand left ring finger reveals erythema and induration of the mid to distal lateral aspect with no evidence of drainage appreciated tender. Findings consistent with paronychia.

## 2023-10-18 ENCOUNTER — OFFICE VISIT (OUTPATIENT)
Dept: FAMILY MEDICINE CLINIC | Facility: CLINIC | Age: 33
End: 2023-10-18

## 2023-10-18 VITALS
WEIGHT: 204 LBS | OXYGEN SATURATION: 96 % | SYSTOLIC BLOOD PRESSURE: 118 MMHG | HEART RATE: 86 BPM | DIASTOLIC BLOOD PRESSURE: 80 MMHG | RESPIRATION RATE: 12 BRPM | BODY MASS INDEX: 29.2 KG/M2 | HEIGHT: 70 IN

## 2023-10-18 DIAGNOSIS — L03.012 PARONYCHIA OF FINGER OF LEFT HAND: ICD-10-CM

## 2023-10-18 DIAGNOSIS — Z23 ENCOUNTER FOR IMMUNIZATION: Primary | ICD-10-CM

## 2023-10-18 NOTE — ASSESSMENT & PLAN NOTE
Finish course of Keflex, it is almost completely resolved. Slight redness and minimal swelling by the nail of the ring finger. Denies any pain.

## 2023-10-18 NOTE — PROGRESS NOTES
Assessment/Plan:    Paronychia of finger of left hand  Finish course of Keflex, it is almost completely resolved. Slight redness and minimal swelling by the nail of the ring finger. Denies any pain. Diagnoses and all orders for this visit:    Encounter for immunization  -     influenza vaccine, quadrivalent, 0.5 mL, preservative-free, for adult and pediatric patients 6 mos+ (AFLURIA, FLUARIX, FLULAVAL, FLUZONE)    Paronychia of finger of left hand          Subjective:      Patient ID: Douglas Fernandez is a 29 y.o. male. Patient came today for follow-up of the recent episode of paronychia, treated with Keflex. The following portions of the patient's history were reviewed and updated as appropriate: allergies, current medications, past family history, past medical history, past social history, past surgical history, and problem list.    Review of Systems   Constitutional:  Negative for chills and fever. Skin:  Negative for color change, rash and wound. Objective:      /80 (BP Location: Left arm, Patient Position: Sitting, Cuff Size: Standard)   Pulse 86   Resp 12   Ht 5' 10" (1.778 m)   Wt 92.5 kg (204 lb)   SpO2 96%   BMI 29.27 kg/m²     Allergies   Allergen Reactions    Cat Hair Extract Itching and Eye Swelling          Current Outpatient Medications:     Cholecalciferol (VITAMIN D3 GUMMIES ADULT PO), Take 1 Units by mouth daily, Disp: , Rfl:      There are no Patient Instructions on file for this visit. Physical Exam  Constitutional:       General: He is not in acute distress. Appearance: He is not ill-appearing or toxic-appearing. Musculoskeletal:         General: No tenderness. Skin:     Findings: No erythema, lesion or rash.

## 2023-10-29 ENCOUNTER — OFFICE VISIT (OUTPATIENT)
Dept: URGENT CARE | Facility: MEDICAL CENTER | Age: 33
End: 2023-10-29
Payer: COMMERCIAL

## 2023-10-29 VITALS
TEMPERATURE: 97.3 F | DIASTOLIC BLOOD PRESSURE: 76 MMHG | RESPIRATION RATE: 20 BRPM | SYSTOLIC BLOOD PRESSURE: 126 MMHG | WEIGHT: 204 LBS | BODY MASS INDEX: 29.2 KG/M2 | OXYGEN SATURATION: 97 % | HEIGHT: 70 IN | HEART RATE: 82 BPM

## 2023-10-29 DIAGNOSIS — L03.012 PARONYCHIA OF FINGER, LEFT: Primary | ICD-10-CM

## 2023-10-29 PROCEDURE — 99213 OFFICE O/P EST LOW 20 MIN: CPT | Performed by: PHYSICIAN ASSISTANT

## 2023-10-29 RX ORDER — CEPHALEXIN 500 MG/1
500 CAPSULE ORAL EVERY 8 HOURS SCHEDULED
Qty: 30 CAPSULE | Refills: 0 | Status: SHIPPED | OUTPATIENT
Start: 2023-10-29 | End: 2023-11-08

## 2023-10-29 NOTE — PROGRESS NOTES
[facility]        NAME: Yuli Richter is a 29 y.o. male  : 1990    MRN: [id number]  DATE: 2023  TIME: 9:40 AM    /76   Pulse 82   Temp (!) 97.3 °F (36.3 °C)   Resp 20   Ht 5' 10" (1.778 m)   Wt 92.5 kg (204 lb)   SpO2 97%   BMI 29.27 kg/m²     Assessment and Plan   Paronychia of finger, left [L03.012]  1. Paronychia of finger, left  cephalexin (KEFLEX) 500 mg capsule            Patient Instructions       Follow up with PCP in 3-5 days. Proceed to  ER if symptoms worsen. Chief Complaint     Chief Complaint   Patient presents with    Hand Pain     He states his L ring finger is infected around the nail bed         History of Present Illness       Pt with return of swelling of left ring finger several days ago, had antibiotics for same about 3 weeks ago         Review of Systems   Review of Systems   Constitutional: Negative. HENT: Negative. Eyes: Negative. Respiratory: Negative. Cardiovascular: Negative. Gastrointestinal: Negative. Endocrine: Negative. Genitourinary: Negative. Musculoskeletal: Negative. Skin: Negative. Allergic/Immunologic: Negative. Neurological: Negative. Hematological: Negative. Psychiatric/Behavioral: Negative. All other systems reviewed and are negative.         Current Medications       Current Outpatient Medications:     cephalexin (KEFLEX) 500 mg capsule, Take 1 capsule (500 mg total) by mouth every 8 (eight) hours for 10 days, Disp: 30 capsule, Rfl: 0    Cholecalciferol (VITAMIN D3 GUMMIES ADULT PO), Take 1 Units by mouth daily, Disp: , Rfl:     Current Allergies     Allergies as of 10/29/2023 - Reviewed 10/29/2023   Allergen Reaction Noted    Cat hair extract Itching and Eye Swelling 2022            The following portions of the patient's history were reviewed and updated as appropriate: allergies, current medications, past family history, past medical history, past social history, past surgical history and problem list.     History reviewed. No pertinent past medical history. Past Surgical History:   Procedure Laterality Date    TONSILLECTOMY         Family History   Problem Relation Age of Onset    Breast cancer Maternal Grandmother          Medications have been verified. Objective   /76   Pulse 82   Temp (!) 97.3 °F (36.3 °C)   Resp 20   Ht 5' 10" (1.778 m)   Wt 92.5 kg (204 lb)   SpO2 97%   BMI 29.27 kg/m²        Physical Exam     Physical Exam  Vitals and nursing note reviewed. Constitutional:       Appearance: Normal appearance. He is normal weight. Cardiovascular:      Rate and Rhythm: Normal rate and regular rhythm. Pulses: Normal pulses. Heart sounds: Normal heart sounds. Pulmonary:      Effort: Pulmonary effort is normal.      Breath sounds: Normal breath sounds. Abdominal:      General: Abdomen is flat. Bowel sounds are normal.      Palpations: Abdomen is soft. Musculoskeletal:         General: Normal range of motion. Comments: Left 4th finger small paronychia, not fluctuant    Skin:     General: Skin is warm. Capillary Refill: Capillary refill takes less than 2 seconds. Neurological:      Mental Status: He is alert and oriented to person, place, and time.

## 2023-11-17 ENCOUNTER — OFFICE VISIT (OUTPATIENT)
Dept: FAMILY MEDICINE CLINIC | Facility: CLINIC | Age: 33
End: 2023-11-17
Payer: COMMERCIAL

## 2023-11-17 VITALS
DIASTOLIC BLOOD PRESSURE: 70 MMHG | BODY MASS INDEX: 30.33 KG/M2 | HEIGHT: 69 IN | OXYGEN SATURATION: 97 % | HEART RATE: 77 BPM | WEIGHT: 204.8 LBS | SYSTOLIC BLOOD PRESSURE: 122 MMHG

## 2023-11-17 DIAGNOSIS — Z13.1 SCREENING FOR DIABETES MELLITUS: ICD-10-CM

## 2023-11-17 DIAGNOSIS — E55.9 VITAMIN D DEFICIENCY: ICD-10-CM

## 2023-11-17 DIAGNOSIS — Z00.00 ANNUAL PHYSICAL EXAM: Primary | ICD-10-CM

## 2023-11-17 DIAGNOSIS — F90.2 ATTENTION DEFICIT HYPERACTIVITY DISORDER (ADHD), COMBINED TYPE: ICD-10-CM

## 2023-11-17 DIAGNOSIS — Z13.89 SCREENING FOR BLOOD OR PROTEIN IN URINE: ICD-10-CM

## 2023-11-17 DIAGNOSIS — Z13.220 SCREENING FOR HYPERLIPIDEMIA: ICD-10-CM

## 2023-11-17 DIAGNOSIS — Z13.29 SCREENING FOR HYPOTHYROIDISM: ICD-10-CM

## 2023-11-17 DIAGNOSIS — L03.012 PARONYCHIA OF FINGER OF LEFT HAND: ICD-10-CM

## 2023-11-17 PROBLEM — Z76.89 ENCOUNTER TO ESTABLISH CARE: Status: RESOLVED | Noted: 2022-11-01 | Resolved: 2023-11-17

## 2023-11-17 PROCEDURE — 99395 PREV VISIT EST AGE 18-39: CPT | Performed by: INTERNAL MEDICINE

## 2023-11-17 NOTE — ASSESSMENT & PLAN NOTE
Adult self-report scale very positive for ADHD. We will do urine drug screen, will consider to start Adderall 10 mg daily, side effects discussed. Follow-up in 1 month.

## 2023-11-17 NOTE — PROGRESS NOTES
[facility]ULT ANNUAL [address] [facility]    NAME: Zeke Nielsen  AGE: 29 y.o. SEX: male  : 1990     DATE: 2023     Assessment and Plan:     Problem List Items Addressed This Visit       Vitamin D deficiency    Relevant Orders    Vitamin D 25 hydroxy    Paronychia of finger of left hand     Seems to be resolved right now. Relevant Medications    mupirocin (BACTROBAN) 2 % ointment    Attention deficit hyperactivity disorder (ADHD), combined type     Adult self-report scale very positive for ADHD. We will do urine drug screen, will consider to start Adderall 10 mg daily, side effects discussed. Follow-up in 1 month. Relevant Orders    [facility] All Prescribed Meds and Special Instructions    Amphetamines, Methamphetamines    Butalbital    Phenobarbital    Secobarbital    Alprazolam    Clonazepam    Diazepam    Lorazepam    Gabapentin    Pregabalin    Cocaine    Heroin    Buprenorphine    Levorphanol    Meperidine    Naltrexone    Fentanyl    Methadone    Oxycodone    Tapentadol    THC    Tramadol    Codeine, Hydrocodone, Hydropmorphone, Morphine    Bath Salts    Ethyl Glucuronide/Ethyl Sulfate    Kratom    Spice    Methylphenidate    Phentermine    Validity Oxidant    Validity Creatinine    Validity pH    Validity Specific     Other Visit Diagnoses       Annual physical exam    -  Primary    Screening for hypothyroidism        Relevant Orders    TSH, 3rd generation with Free T4 reflex    Screening for hyperlipidemia        Relevant Orders    Lipid panel    Screening for blood or protein in urine        Relevant Orders    UA (URINE) with reflex to Scope    Screening for diabetes mellitus        Relevant Orders    HEMOGLOBIN A1C W/ EAG ESTIMATION            Immunizations and preventive care screenings were discussed with patient today. Appropriate education was printed on patient's after visit summary.     Counseling:  Exercise: the importance of regular exercise/physical activity was discussed. Recommend exercise 3-5 times per week for at least 30 minutes. BMI Counseling: Body mass index is 30.24 kg/m². The BMI is above normal. Nutrition recommendations include decreasing portion sizes. Rationale for BMI follow-up plan is due to patient being overweight or obese. Return in about 4 weeks (around 12/15/2023) for Recheck. Chief Complaint:     Chief Complaint   Patient presents with    Physical Exam    Follow-up     Finger infection, back pain, mental illness. History of Present Illness:     Adult Annual Physical   Patient here for a comprehensive physical exam. The patient reports     Diet and Physical Activity  Diet/Nutrition: well balanced diet. Exercise: no formal exercise. Depression Screening  PHQ-2/9 Depression Screening           General Health  Sleep: sleeps well. Hearing: normal - none . Vision: no vision problems. Dental: regular dental visits.  Health  History of STDs?: no.    Advanced Care Planning  Do you have an advanced directive? no  Do you have a durable medical power of ? yes     Review of Systems:     Review of Systems   Constitutional:  Negative for chills, fatigue and fever. Respiratory:  Negative for cough, chest tightness and shortness of breath. Cardiovascular:  Negative for chest pain, palpitations and leg swelling. Gastrointestinal:  Negative for abdominal distention, abdominal pain, blood in stool, constipation, diarrhea and nausea. Genitourinary:  Negative for difficulty urinating and dysuria. Musculoskeletal:  Negative for arthralgias, back pain, gait problem, joint swelling, myalgias and neck pain. Skin:  Negative for rash. Neurological:  Negative for dizziness, weakness, numbness and headaches. Psychiatric/Behavioral:  Positive for decreased concentration and dysphoric mood. Negative for agitation, confusion and suicidal ideas.  The patient is not nervous/anxious. Past Medical History:     No past medical history on file. Past Surgical History:     Past Surgical History:   Procedure Laterality Date    TONSILLECTOMY        Social History:     Social History     Socioeconomic History    Marital status: /Civil Union     Spouse name: None    Number of children: None    Years of education: None    Highest education level: None   Occupational History    None   Tobacco Use    Smoking status: Never    Smokeless tobacco: Never   Vaping Use    Vaping Use: Never used   Substance and Sexual Activity    Alcohol use: Yes     Comment: occational    Drug use: No    Sexual activity: Yes     Partners: Female   Other Topics Concern    None   Social History Narrative    None     Social Determinants of Health     Financial Resource Strain: Not on file   Food Insecurity: Not on file   Transportation Needs: Not on file   Physical Activity: Not on file   Stress: Not on file   Social Connections: Not on file   Intimate Partner Violence: Not on file   Housing Stability: Not on file      Family History:     Family History   Problem Relation Age of Onset    Breast cancer Maternal Grandmother       Current Medications:     Current Outpatient Medications   Medication Sig Dispense Refill    Cholecalciferol (VITAMIN D3 GUMMIES ADULT PO) Take 1 Units by mouth daily      mupirocin (BACTROBAN) 2 % ointment Apply topically daily 22 g 1     No current facility-administered medications for this visit. Allergies: Allergies   Allergen Reactions    Cat Hair Extract Itching and Eye Swelling      Physical Exam:     /70 (BP Location: Left arm, Patient Position: Sitting, Cuff Size: Large)   Pulse 77   Ht 5' 9" (1.753 m)   Wt 92.9 kg (204 lb 12.8 oz)   SpO2 97%   BMI 30.24 kg/m²     Physical Exam  Vitals reviewed. Constitutional:       Appearance: Normal appearance. Eyes:      Pupils: Pupils are equal, round, and reactive to light.    Cardiovascular:      Rate and Rhythm: Normal rate and regular rhythm. Heart sounds: No murmur heard. No friction rub. No gallop. Pulmonary:      Effort: Pulmonary effort is normal. No respiratory distress. Breath sounds: Normal breath sounds. No wheezing, rhonchi or rales. Abdominal:      General: There is no distension. Tenderness: There is no abdominal tenderness. There is no guarding or rebound. Musculoskeletal:         General: No swelling or tenderness. Skin:     Coloration: Skin is not jaundiced or pale. Findings: No rash. Neurological:      Mental Status: He is alert.    Psychiatric:         Mood and Affect: Mood normal.         Behavior: Behavior normal.          Nato Lundberg MD   [address] [place] [address]

## 2023-11-19 LAB

## 2023-11-22 DIAGNOSIS — F90.2 ATTENTION DEFICIT HYPERACTIVITY DISORDER (ADHD), COMBINED TYPE: Primary | ICD-10-CM

## 2023-11-22 RX ORDER — DEXTROAMPHETAMINE SACCHARATE, AMPHETAMINE ASPARTATE MONOHYDRATE, DEXTROAMPHETAMINE SULFATE AND AMPHETAMINE SULFATE 2.5; 2.5; 2.5; 2.5 MG/1; MG/1; MG/1; MG/1
10 CAPSULE, EXTENDED RELEASE ORAL EVERY MORNING
Qty: 30 CAPSULE | Refills: 0 | Status: SHIPPED | OUTPATIENT
Start: 2023-11-22

## 2023-12-04 ENCOUNTER — TELEPHONE (OUTPATIENT)
Dept: FAMILY MEDICINE CLINIC | Facility: CLINIC | Age: 33
End: 2023-12-04

## 2023-12-08 ENCOUNTER — RA CDI HCC (OUTPATIENT)
Dept: OTHER | Facility: HOSPITAL | Age: 33
End: 2023-12-08

## 2023-12-08 NOTE — PROGRESS NOTES
[address] [facility] coding opportunities       Chart reviewed, no opportunity found: CHART REVIEWED, NO OPPORTUNITY FOUND        Patients Insurance        Commercial Insurance: [address]

## 2023-12-15 ENCOUNTER — OFFICE VISIT (OUTPATIENT)
Dept: FAMILY MEDICINE CLINIC | Facility: CLINIC | Age: 33
End: 2023-12-15
Payer: COMMERCIAL

## 2023-12-15 VITALS
HEART RATE: 88 BPM | RESPIRATION RATE: 12 BRPM | DIASTOLIC BLOOD PRESSURE: 80 MMHG | WEIGHT: 202.4 LBS | HEIGHT: 70 IN | SYSTOLIC BLOOD PRESSURE: 130 MMHG | BODY MASS INDEX: 28.98 KG/M2 | OXYGEN SATURATION: 98 %

## 2023-12-15 DIAGNOSIS — F90.2 ATTENTION DEFICIT HYPERACTIVITY DISORDER (ADHD), COMBINED TYPE: Primary | ICD-10-CM

## 2023-12-15 DIAGNOSIS — L03.012 PARONYCHIA OF FINGER OF LEFT HAND: ICD-10-CM

## 2023-12-15 PROCEDURE — 99214 OFFICE O/P EST MOD 30 MIN: CPT | Performed by: INTERNAL MEDICINE

## 2023-12-15 RX ORDER — DEXTROAMPHETAMINE SACCHARATE, AMPHETAMINE ASPARTATE MONOHYDRATE, DEXTROAMPHETAMINE SULFATE AND AMPHETAMINE SULFATE 1.25; 1.25; 1.25; 1.25 MG/1; MG/1; MG/1; MG/1
5 CAPSULE, EXTENDED RELEASE ORAL EVERY MORNING
Qty: 30 CAPSULE | Refills: 0 | Status: SHIPPED | OUTPATIENT
Start: 2023-12-15

## 2023-12-15 RX ORDER — CLOBETASOL PROPIONATE 0.5 MG/G
CREAM TOPICAL 2 TIMES DAILY
Qty: 30 G | Refills: 0 | Status: SHIPPED | OUTPATIENT
Start: 2023-12-15

## 2023-12-15 NOTE — PATIENT INSTRUCTIONS
Please buy Omron upper arm blood pressure cuff, check it few times a week make sure that your blood pressure is less than 130-140/90. Update me in 3 weeks on how you do.

## 2023-12-15 NOTE — ASSESSMENT & PLAN NOTE
He felt improvement being on 10 mg of Adderall daily but started to notice extra beats that were sometimes symptomatic with lightheadedness. Physical exam revealed small burden of extra beats, will decrease his Adderall down to 5 mg, he will continue to watch his symptoms. He will update me in few weeks.

## 2023-12-15 NOTE — PROGRESS NOTES
Assessment/Plan:    Paronychia of finger of left hand  Will try high potency topical steroid cream for few weeks. Attention deficit hyperactivity disorder (ADHD), combined type  He felt improvement being on 10 mg of Adderall daily but started to notice extra beats that were sometimes symptomatic with lightheadedness. Physical exam revealed small burden of extra beats, will decrease his Adderall down to 5 mg, he will continue to watch his symptoms. He will update me in few weeks. Diagnoses and all orders for this visit:    Attention deficit hyperactivity disorder (ADHD), combined type  -     amphetamine-dextroamphetamine (ADDERALL XR, 5MG,) 5 MG 24 hr capsule; Take 1 capsule (5 mg total) by mouth every morning Max Daily Amount: 5 mg    Paronychia of finger of left hand  -     clobetasol (TEMOVATE) 0.05 % cream; Apply topically 2 (two) times a day          Subjective:      Patient ID: Juan Manuel Sepulveda is a 29 y.o. male. Patient came today for follow-up on his chronic problems. The following portions of the patient's history were reviewed and updated as appropriate: allergies, current medications, past family history, past medical history, past social history, past surgical history, and problem list.    Review of Systems   Constitutional:  Negative for chills, fatigue and fever. Respiratory:  Negative for cough, chest tightness and shortness of breath. Cardiovascular:  Positive for palpitations. Negative for chest pain and leg swelling. Gastrointestinal:  Negative for abdominal distention, abdominal pain, blood in stool, constipation, diarrhea and nausea. Genitourinary:  Negative for difficulty urinating and dysuria. Musculoskeletal:  Negative for arthralgias, back pain, gait problem, joint swelling, myalgias and neck pain. Skin:  Negative for rash. Neurological:  Negative for dizziness, weakness, numbness and headaches. Psychiatric/Behavioral:  Positive for decreased concentration. Negative for agitation, confusion, dysphoric mood and suicidal ideas. The patient is not nervous/anxious. Objective:      /80 (BP Location: Left arm, Patient Position: Sitting, Cuff Size: Standard)   Pulse 88   Resp 12   Ht 5' 10" (1.778 m)   Wt 91.8 kg (202 lb 6.4 oz)   SpO2 98%   BMI 29.04 kg/m²     Allergies   Allergen Reactions    Cat Hair Extract Itching and Eye Swelling          Current Outpatient Medications:     amphetamine-dextroamphetamine (ADDERALL XR, 5MG,) 5 MG 24 hr capsule, Take 1 capsule (5 mg total) by mouth every morning Max Daily Amount: 5 mg, Disp: 30 capsule, Rfl: 0    clobetasol (TEMOVATE) 0.05 % cream, Apply topically 2 (two) times a day, Disp: 30 g, Rfl: 0    Cholecalciferol (VITAMIN D3 GUMMIES ADULT PO), Take 1 Units by mouth daily, Disp: , Rfl:     mupirocin (BACTROBAN) 2 % ointment, Apply topically daily, Disp: 22 g, Rfl: 1     Patient Instructions   Please buy Omron upper arm blood pressure cuff, check it few times a week make sure that your blood pressure is less than 130-140/90. Update me in 3 weeks on how you do. Physical Exam  Constitutional:       General: He is not in acute distress. Appearance: He is not ill-appearing or toxic-appearing. Cardiovascular:      Rate and Rhythm: Normal rate. Heart sounds: No murmur heard. No gallop. Pulmonary:      Effort: No respiratory distress. Breath sounds: No wheezing or rales.

## 2024-01-15 DIAGNOSIS — F90.2 ATTENTION DEFICIT HYPERACTIVITY DISORDER (ADHD), COMBINED TYPE: ICD-10-CM

## 2024-01-16 RX ORDER — DEXTROAMPHETAMINE SACCHARATE, AMPHETAMINE ASPARTATE MONOHYDRATE, DEXTROAMPHETAMINE SULFATE AND AMPHETAMINE SULFATE 1.25; 1.25; 1.25; 1.25 MG/1; MG/1; MG/1; MG/1
5 CAPSULE, EXTENDED RELEASE ORAL EVERY MORNING
Qty: 30 CAPSULE | Refills: 0 | Status: SHIPPED | OUTPATIENT
Start: 2024-01-16

## 2024-01-19 ENCOUNTER — TELEPHONE (OUTPATIENT)
Age: 34
End: 2024-01-19

## 2024-01-19 NOTE — TELEPHONE ENCOUNTER
Outreach TC to patient to set up an appointment. Appointment scheduled for 1/22/24 with Dr. Salinas for a recheck of his finger.

## 2024-01-22 ENCOUNTER — OFFICE VISIT (OUTPATIENT)
Dept: FAMILY MEDICINE CLINIC | Facility: CLINIC | Age: 34
End: 2024-01-22
Payer: COMMERCIAL

## 2024-01-22 ENCOUNTER — APPOINTMENT (OUTPATIENT)
Dept: RADIOLOGY | Facility: MEDICAL CENTER | Age: 34
End: 2024-01-22
Payer: COMMERCIAL

## 2024-01-22 VITALS
WEIGHT: 207.4 LBS | BODY MASS INDEX: 29.76 KG/M2 | OXYGEN SATURATION: 99 % | HEART RATE: 69 BPM | SYSTOLIC BLOOD PRESSURE: 124 MMHG | TEMPERATURE: 98.6 F | DIASTOLIC BLOOD PRESSURE: 86 MMHG

## 2024-01-22 DIAGNOSIS — L03.012 PARONYCHIA OF FINGER OF LEFT HAND: Primary | ICD-10-CM

## 2024-01-22 DIAGNOSIS — L03.012 PARONYCHIA OF FINGER OF LEFT HAND: ICD-10-CM

## 2024-01-22 PROCEDURE — 99213 OFFICE O/P EST LOW 20 MIN: CPT | Performed by: FAMILY MEDICINE

## 2024-01-22 PROCEDURE — 73130 X-RAY EXAM OF HAND: CPT

## 2024-01-22 RX ORDER — CEPHALEXIN 500 MG/1
500 CAPSULE ORAL EVERY 8 HOURS SCHEDULED
Qty: 30 CAPSULE | Refills: 0 | Status: SHIPPED | OUTPATIENT
Start: 2024-01-22 | End: 2024-02-01

## 2024-01-24 NOTE — PROGRESS NOTES
GettingAssessment/Plan:       Problem List Items Addressed This Visit        Musculoskeletal and Integument    Paronychia of finger of left hand - Primary     Check an x-ray to rule out foreign body.  I may refer him to hand surgery if another round of cephalexin is not helpful.  I was not able to drain any paronychia today with pressure and I did not note any specific area of abscess.         Relevant Medications    cephalexin (KEFLEX) 500 mg capsule    Other Relevant Orders    XR hand 3+ vw left (Completed)         Subjective:      Patient ID: Rob Dong is a 33 y.o. male.    Rash      Patient presents today with some ongoing discomfort of his index finger on his left hand.  He noted some initial improvement after Keflex.  He does not feel that topical Bactroban has been helpful at all.  The pain is much worse when you touch the tip of his fingertip.  He denies any fever or chills.  He denies any preceding trauma.    The following portions of the patient's history were reviewed and updated as appropriate: allergies, current medications, past family history, past medical history, past social history, past surgical history and problem list.      Current Outpatient Medications:   •  amphetamine-dextroamphetamine (ADDERALL XR, 5MG,) 5 MG 24 hr capsule, Take 1 capsule (5 mg total) by mouth every morning Max Daily Amount: 5 mg, Disp: 30 capsule, Rfl: 0  •  cephalexin (KEFLEX) 500 mg capsule, Take 1 capsule (500 mg total) by mouth every 8 (eight) hours for 10 days, Disp: 30 capsule, Rfl: 0  •  Cholecalciferol (VITAMIN D3 GUMMIES ADULT PO), Take 1 Units by mouth daily, Disp: , Rfl:   •  clobetasol (TEMOVATE) 0.05 % cream, Apply topically 2 (two) times a day, Disp: 30 g, Rfl: 0  •  mupirocin (BACTROBAN) 2 % ointment, Apply topically daily, Disp: 22 g, Rfl: 1     Review of Systems   Skin:  Positive for rash.         Objective:      /86 (BP Location: Left arm, Patient Position: Sitting, Cuff Size: Standard)   Pulse  69   Temp 98.6 °F (37 °C) (Tympanic)   Wt 94.1 kg (207 lb 6.4 oz)   SpO2 99%   BMI 29.76 kg/m²          Physical Exam  Constitutional:       Appearance: Normal appearance.   Cardiovascular:      Rate and Rhythm: Regular rhythm.      Pulses: Normal pulses.      Heart sounds: Normal heart sounds.   Skin:     Comments: Tip of his left index finger was erythematous and somewhat indurated.  There was tenderness to palpation especially in the lateral nailbed.           Mohan Desai Jr, MD

## 2024-01-24 NOTE — ASSESSMENT & PLAN NOTE
Check an x-ray to rule out foreign body.  I may refer him to hand surgery if another round of cephalexin is not helpful.  I was not able to drain any paronychia today with pressure and I did not note any specific area of abscess.

## 2024-01-27 ENCOUNTER — APPOINTMENT (OUTPATIENT)
Dept: LAB | Facility: MEDICAL CENTER | Age: 34
End: 2024-01-27
Payer: COMMERCIAL

## 2024-01-27 DIAGNOSIS — E55.9 VITAMIN D DEFICIENCY: ICD-10-CM

## 2024-01-27 DIAGNOSIS — Z13.1 SCREENING FOR DIABETES MELLITUS: ICD-10-CM

## 2024-01-27 DIAGNOSIS — Z13.220 SCREENING FOR HYPERLIPIDEMIA: ICD-10-CM

## 2024-01-27 DIAGNOSIS — Z13.29 SCREENING FOR HYPOTHYROIDISM: ICD-10-CM

## 2024-01-27 LAB
25(OH)D3 SERPL-MCNC: 27.2 NG/ML (ref 30–100)
BACTERIA UR QL AUTO: NORMAL /HPF
BILIRUB UR QL STRIP: NEGATIVE
CHOLEST SERPL-MCNC: 143 MG/DL
CLARITY UR: CLEAR
COLOR UR: ABNORMAL
EST. AVERAGE GLUCOSE BLD GHB EST-MCNC: 105 MG/DL
GLUCOSE UR STRIP-MCNC: NEGATIVE MG/DL
HBA1C MFR BLD: 5.3 %
HDLC SERPL-MCNC: 49 MG/DL
HGB UR QL STRIP.AUTO: NEGATIVE
KETONES UR STRIP-MCNC: NEGATIVE MG/DL
LDLC SERPL CALC-MCNC: 79 MG/DL (ref 0–100)
LEUKOCYTE ESTERASE UR QL STRIP: NEGATIVE
NITRITE UR QL STRIP: NEGATIVE
NON-SQ EPI CELLS URNS QL MICRO: NORMAL /HPF
NONHDLC SERPL-MCNC: 94 MG/DL
PH UR STRIP.AUTO: 7 [PH]
PROT UR STRIP-MCNC: ABNORMAL MG/DL
RBC #/AREA URNS AUTO: NORMAL /HPF
SP GR UR STRIP.AUTO: 1.02 (ref 1–1.03)
TRIGL SERPL-MCNC: 73 MG/DL
TSH SERPL DL<=0.05 MIU/L-ACNC: 2.48 UIU/ML (ref 0.45–4.5)
UROBILINOGEN UR STRIP-ACNC: <2 MG/DL
WBC #/AREA URNS AUTO: NORMAL /HPF

## 2024-01-27 PROCEDURE — 84443 ASSAY THYROID STIM HORMONE: CPT

## 2024-01-27 PROCEDURE — 36415 COLL VENOUS BLD VENIPUNCTURE: CPT

## 2024-01-27 PROCEDURE — 82306 VITAMIN D 25 HYDROXY: CPT

## 2024-01-27 PROCEDURE — 80061 LIPID PANEL: CPT

## 2024-01-27 PROCEDURE — 83036 HEMOGLOBIN GLYCOSYLATED A1C: CPT

## 2024-02-02 DIAGNOSIS — L03.012 PARONYCHIA OF FINGER OF LEFT HAND: Primary | ICD-10-CM

## 2024-02-08 ENCOUNTER — OFFICE VISIT (OUTPATIENT)
Dept: OBGYN CLINIC | Facility: MEDICAL CENTER | Age: 34
End: 2024-02-08
Payer: COMMERCIAL

## 2024-02-08 VITALS — WEIGHT: 207 LBS | BODY MASS INDEX: 29.63 KG/M2 | HEIGHT: 70 IN

## 2024-02-08 DIAGNOSIS — L60.0: Primary | ICD-10-CM

## 2024-02-08 DIAGNOSIS — L03.012 PARONYCHIA OF FINGER OF LEFT HAND: ICD-10-CM

## 2024-02-08 PROCEDURE — 11730 AVULSION NAIL PLATE SIMPLE 1: CPT | Performed by: ORTHOPAEDIC SURGERY

## 2024-02-08 PROCEDURE — 99203 OFFICE O/P NEW LOW 30 MIN: CPT | Performed by: ORTHOPAEDIC SURGERY

## 2024-02-08 RX ORDER — CEPHALEXIN 500 MG/1
500 CAPSULE ORAL EVERY 6 HOURS SCHEDULED
Qty: 28 CAPSULE | Refills: 0 | Status: SHIPPED | OUTPATIENT
Start: 2024-02-08 | End: 2024-02-15

## 2024-02-08 NOTE — PROGRESS NOTES
The HAND & UPPER EXTREMITY OFFICE VISIT   Referred By:  Prudencio Wood Md  [address] [place] [address]  [address]  [place] [address]      Chief Complaint:     Left ring finger pain    History of Present Illness:   33 y.o., Right hand dominant male presents with left ring ring finger pain with touch. He notes there is swelling which has been present since October. He was evaluated at [facility] on 10/1/23 where he was provided Cephalexin 500 mg. He was placed on Cephalexin three times. He was also proved Temovate 0.05% and Bactroban 2%. He notes there were a few times it was close to being resolved, however it returned. There has been discharge from this area that is white in nature. He soaks his finger in warm water which helps with his swelling, decreases the amount of pain, and promotes more discharge. 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 No pertinent past medical history.  Past Surgical History:   Procedure Laterality Date    TONSILLECTOMY       Family History   Problem Relation Age of Onset    Breast cancer Maternal Grandmother      Social History     Socioeconomic History    Marital status: /Civil Union     Spouse name: Not on file    Number of children: Not on file    Years of education: Not on file    Highest education level: Not on file   Occupational History    Not on file   Tobacco Use    Smoking status: Never    Smokeless tobacco: Never   Vaping Use    Vaping status: Never Used   Substance and Sexual Activity    Alcohol use: Yes     Comment: occational    Drug use: No    Sexual activity: Yes     Partners: Female   Other Topics Concern    Not on file   Social History Narrative    Not on file     Social Determinants of Health     Financial Resource Strain: Not on file   Food Insecurity: Not on file   Transportation  "Needs: Not on file   Physical Activity: Not on file   Stress: Not on file   Social Connections: Not on file   Intimate Partner Violence: Not on file   Housing Stability: Not on file     Scheduled Meds:  Continuous Infusions:No current facility-administered medications for this visit.    PRN Meds:.  Allergies   Allergen Reactions    Cat Hair Extract Itching and Eye Swelling           Physical Examination:    Ht 5' 10\" (1.778 m)   Wt 93.9 kg (207 lb)   BMI 29.70 kg/m²     Gen: A&Ox3, NAD  Cardiac: regular rate  Chest: non labored breathing  Abdomen: Non-distended      Right Upper Extremity:  Skin CDI  No obvious deformity of the shoulder, arm, elbow, forearm, wrist, hand  Full composite fist   Sensation intact to light touch in the axillary median, ulnar, and radial nerve distributions  5/5 motor   2+RP      Left Upper Extremity:  Skin CDI  No obvious deformity of the shoulder, arm, elbow, forearm, wrist, hand  Full composite fist  Swelling and redness of the ulnar ring finger nail bed  Inward growing nail at ulnar ring finger nail bed  Tenderness to palpation at ulnar ring finger nail bed  Sensation intact to light touch in the axillary median, ulnar, and radial nerve distributions and R/U finger tips  5/5 motor   2+RP      Studies:  Radiographs: I personally reviewed and independently interpreted the available radiographs.  X-rays taken 1/22/24: Radiographs of the Left hand, multiple views, demonstrate no acute osseous abnormality or fracture.  No evidence of osteomyelitis    Nail removal    Date/Time: 2/8/2024 9:15 AM    Performed by: Cuong Fontenot MD  Authorized by: Cuong Fontenot MD    Patient location:  Clinic  Indications / Diagnosis:  Ingrown nail/paronychia  Universal Protocol:  Consent: Verbal consent obtained.  Risks and benefits: risks, benefits and alternatives were discussed  Consent given by: patient  Patient identity confirmed: verbally with patient    Location:     Hand:  L ring " finger  Pre-procedure details:     Skin preparation:  Betadine    Preparation: Patient was prepped and draped in the usual sterile fashion    Anesthesia (see MAR for exact dosages):     Anesthesia method:  Local infiltration    Local anesthetic:  Lidocaine 1% w/o epi  Nail Removal:     Nail removed:  Partial    Nail side:  Ulnar    Nail bed sutured: no    Ingrown nail:     Wedge excision of skin: no      Nail matrix removed or ablated:  None  Post-procedure details:     Dressing:  Xeroform gauze and gauze roll    Patient tolerance of procedure:  Tolerated well, no immediate complications        Assessment and Plan:  1. Ingrown nail of left ring finger        2. Paronychia of finger of left hand  Ambulatory Referral to Hand Surgery    cephalexin (KEFLEX) 500 mg capsule    Hand/upper extremity injection          33 y.o. male presents with signs and symptoms consistent with the above diagnosis.  We discussed the natural history of this condition and its pathogenesis.  We discussed operative and nonoperative treatment options.    The patient's x-rays were reviewed today which are normal. The patient has recurrent paronychias despite the use of Keflex three times and topical creams. They do resolve for a time after abx treatment but recurr. I think they are recurring due to his ingrown nail. Treatment options were discussed in the form of partial nail removal in the office or in the operating room. The patient elected to move forward with partial nail removal in the office today. This was well tolerated. Please see procedure note for detail.    He was instructed to leave the dressing on and may remove later tonight. He may wash his hands with warm soapy water and can soak the finger twice daily. The patient was provided Keflex for 1 week to treat the paronychia    he expressed understanding of the plan and agreed. We encouraged them to contact our office with any questions or concerns.     I will see the patient back in  approximately 1 week for re-evaluation.     Cuong Fontenot MD  Hand and Upper Extremity Surgery        *This note was dictated using Dragon voice recognition software. Please excuse any word substitutions or errors.*    Scribe Attestation      I,:  Jazmin Whitman am acting as a scribe while in the presence of the attending physician.:       I,:  Cuong Fontenot MD personally performed the services described in this documentation    as scribed in my presence.:

## 2024-02-18 DIAGNOSIS — F90.2 ATTENTION DEFICIT HYPERACTIVITY DISORDER (ADHD), COMBINED TYPE: ICD-10-CM

## 2024-02-19 RX ORDER — DEXTROAMPHETAMINE SACCHARATE, AMPHETAMINE ASPARTATE MONOHYDRATE, DEXTROAMPHETAMINE SULFATE AND AMPHETAMINE SULFATE 1.25; 1.25; 1.25; 1.25 MG/1; MG/1; MG/1; MG/1
5 CAPSULE, EXTENDED RELEASE ORAL EVERY MORNING
Qty: 30 CAPSULE | Refills: 0 | Status: SHIPPED | OUTPATIENT
Start: 2024-02-19

## 2024-03-24 DIAGNOSIS — F90.2 ATTENTION DEFICIT HYPERACTIVITY DISORDER (ADHD), COMBINED TYPE: ICD-10-CM

## 2024-03-25 RX ORDER — DEXTROAMPHETAMINE SACCHARATE, AMPHETAMINE ASPARTATE MONOHYDRATE, DEXTROAMPHETAMINE SULFATE AND AMPHETAMINE SULFATE 1.25; 1.25; 1.25; 1.25 MG/1; MG/1; MG/1; MG/1
5 CAPSULE, EXTENDED RELEASE ORAL EVERY MORNING
Qty: 30 CAPSULE | Refills: 0 | Status: SHIPPED | OUTPATIENT
Start: 2024-03-25

## 2024-05-01 ENCOUNTER — TELEPHONE (OUTPATIENT)
Age: 34
End: 2024-05-01

## 2024-05-01 DIAGNOSIS — F90.2 ATTENTION DEFICIT HYPERACTIVITY DISORDER (ADHD), COMBINED TYPE: ICD-10-CM

## 2024-05-01 RX ORDER — DEXTROAMPHETAMINE SACCHARATE, AMPHETAMINE ASPARTATE MONOHYDRATE, DEXTROAMPHETAMINE SULFATE AND AMPHETAMINE SULFATE 1.25; 1.25; 1.25; 1.25 MG/1; MG/1; MG/1; MG/1
5 CAPSULE, EXTENDED RELEASE ORAL EVERY MORNING
Qty: 10 CAPSULE | Refills: 0 | Status: SHIPPED | OUTPATIENT
Start: 2024-05-01 | End: 2024-05-01 | Stop reason: SDUPTHER

## 2024-05-01 NOTE — TELEPHONE ENCOUNTER
Rob Bear    Patient is asking if he can get 30 days of medication ( ADDERALL 5 mg) and if someone can call him back today to make an appt.    Patient took his last one yesterday.    CB: [phone]

## 2024-05-01 NOTE — TELEPHONE ENCOUNTER
Pt has been scheduled with  as PCP will not be in within the 10 days and he will run out of medication.

## 2024-05-10 ENCOUNTER — OFFICE VISIT (OUTPATIENT)
Dept: FAMILY MEDICINE CLINIC | Facility: CLINIC | Age: 34
End: 2024-05-10
Payer: COMMERCIAL

## 2024-05-10 VITALS
HEIGHT: 70 IN | DIASTOLIC BLOOD PRESSURE: 84 MMHG | WEIGHT: 206 LBS | BODY MASS INDEX: 29.49 KG/M2 | OXYGEN SATURATION: 98 % | RESPIRATION RATE: 20 BRPM | HEART RATE: 80 BPM | TEMPERATURE: 96.7 F | SYSTOLIC BLOOD PRESSURE: 122 MMHG

## 2024-05-10 DIAGNOSIS — F90.2 ATTENTION DEFICIT HYPERACTIVITY DISORDER (ADHD), COMBINED TYPE: Primary | ICD-10-CM

## 2024-05-10 PROCEDURE — 99214 OFFICE O/P EST MOD 30 MIN: CPT | Performed by: FAMILY MEDICINE

## 2024-05-10 NOTE — PROGRESS NOTES
Assessment/Plan:       Problem List Items Addressed This Visit          Behavioral Health    Attention deficit hyperactivity disorder (ADHD), combined type - Primary       1. Attention deficit hyperactivity disorder (ADHD), combined type    Reports great improvement on treatment without side effects at lower dose, recommend continue treatment and follow up with Dr. Wood.  See scanned in self report ADHD form with great improvement from previous.    Subjective:      Patient ID: Rob Dong is a 33 y.o. male.    HPI    33 year old presenting in follow up of ADHD,     Recently started on adderall 5mg reports feeling well, heart palpations have resolved on lower dose    Reports improvement at home and work with focus.      The following portions of the patient's history were reviewed and updated as appropriate: allergies, current medications, past family history, past medical history, past social history, past surgical history and problem list.      Current Outpatient Medications:     amphetamine-dextroamphetamine (ADDERALL XR, 5MG,) 5 MG 24 hr capsule, Take 1 capsule (5 mg total) by mouth every morning Max Daily Amount: 5 mg, Disp: 30 capsule, Rfl: 0    Cholecalciferol (VITAMIN D3 GUMMIES ADULT PO), Take 1 Units by mouth daily, Disp: , Rfl:     clobetasol (TEMOVATE) 0.05 % cream, Apply topically 2 (two) times a day (Patient not taking: Reported on 5/10/2024), Disp: 30 g, Rfl: 0    mupirocin (BACTROBAN) 2 % ointment, Apply topically daily (Patient not taking: Reported on 5/10/2024), Disp: 22 g, Rfl: 1     Review of Systems   Constitutional:  Negative for activity change and appetite change.   Respiratory:  Negative for apnea and chest tightness.    Cardiovascular:  Negative for chest pain and palpitations.   Gastrointestinal:  Negative for abdominal distention and abdominal pain.   Musculoskeletal:  Negative for arthralgias and back pain.         Objective:      /84 (BP Location: Left arm, Patient Position:  "Sitting, Cuff Size: Large)   Pulse 80   Temp (!) 96.7 °F (35.9 °C) (Tympanic)   Resp 20   Ht 5' 10\" (1.778 m)   Wt 93.4 kg (206 lb)   SpO2 98%   BMI 29.56 kg/m²          Physical Exam  Constitutional:       Appearance: Normal appearance.   Cardiovascular:      Rate and Rhythm: Normal rate and regular rhythm.      Pulses: Normal pulses.      Heart sounds: Normal heart sounds.   Pulmonary:      Effort: Pulmonary effort is normal.      Breath sounds: Normal breath sounds.   Musculoskeletal:         General: Normal range of motion.   Neurological:      General: No focal deficit present.      Mental Status: He is alert and oriented to person, place, and time.           Elliot Tirado MD  "

## 2024-06-02 DIAGNOSIS — F90.2 ATTENTION DEFICIT HYPERACTIVITY DISORDER (ADHD), COMBINED TYPE: ICD-10-CM

## 2024-06-03 RX ORDER — DEXTROAMPHETAMINE SACCHARATE, AMPHETAMINE ASPARTATE MONOHYDRATE, DEXTROAMPHETAMINE SULFATE AND AMPHETAMINE SULFATE 1.25; 1.25; 1.25; 1.25 MG/1; MG/1; MG/1; MG/1
5 CAPSULE, EXTENDED RELEASE ORAL EVERY MORNING
Qty: 30 CAPSULE | Refills: 0 | Status: SHIPPED | OUTPATIENT
Start: 2024-06-03

## 2024-06-04 DIAGNOSIS — Z00.6 ENCOUNTER FOR EXAMINATION FOR NORMAL COMPARISON OR CONTROL IN CLINICAL RESEARCH PROGRAM: ICD-10-CM

## 2024-07-03 DIAGNOSIS — F90.2 ATTENTION DEFICIT HYPERACTIVITY DISORDER (ADHD), COMBINED TYPE: ICD-10-CM

## 2024-07-03 RX ORDER — DEXTROAMPHETAMINE SACCHARATE, AMPHETAMINE ASPARTATE MONOHYDRATE, DEXTROAMPHETAMINE SULFATE AND AMPHETAMINE SULFATE 1.25; 1.25; 1.25; 1.25 MG/1; MG/1; MG/1; MG/1
5 CAPSULE, EXTENDED RELEASE ORAL EVERY MORNING
Qty: 30 CAPSULE | Refills: 0 | Status: SHIPPED | OUTPATIENT
Start: 2024-07-03

## 2024-07-31 DIAGNOSIS — F90.2 ATTENTION DEFICIT HYPERACTIVITY DISORDER (ADHD), COMBINED TYPE: ICD-10-CM

## 2024-08-01 RX ORDER — DEXTROAMPHETAMINE SACCHARATE, AMPHETAMINE ASPARTATE MONOHYDRATE, DEXTROAMPHETAMINE SULFATE AND AMPHETAMINE SULFATE 1.25; 1.25; 1.25; 1.25 MG/1; MG/1; MG/1; MG/1
5 CAPSULE, EXTENDED RELEASE ORAL EVERY MORNING
Qty: 30 CAPSULE | Refills: 0 | Status: SHIPPED | OUTPATIENT
Start: 2024-08-01

## 2024-08-16 ENCOUNTER — RA CDI HCC (OUTPATIENT)
Dept: OTHER | Facility: HOSPITAL | Age: 34
End: 2024-08-16

## 2024-08-16 NOTE — PROGRESS NOTES
HCC coding opportunities       Chart reviewed, no opportunity found: CHART REVIEWED, NO OPPORTUNITY FOUND        Patients Insurance        Commercial Insurance: [employer]

## 2024-08-23 ENCOUNTER — TELEPHONE (OUTPATIENT)
Dept: FAMILY MEDICINE CLINIC | Facility: CLINIC | Age: 34
End: 2024-08-23

## 2024-08-23 ENCOUNTER — OFFICE VISIT (OUTPATIENT)
Dept: FAMILY MEDICINE CLINIC | Facility: CLINIC | Age: 34
End: 2024-08-23
Payer: COMMERCIAL

## 2024-08-23 VITALS
SYSTOLIC BLOOD PRESSURE: 110 MMHG | OXYGEN SATURATION: 99 % | TEMPERATURE: 98 F | HEART RATE: 70 BPM | HEIGHT: 69 IN | RESPIRATION RATE: 18 BRPM | DIASTOLIC BLOOD PRESSURE: 74 MMHG | BODY MASS INDEX: 29.92 KG/M2 | WEIGHT: 202 LBS

## 2024-08-23 DIAGNOSIS — F90.2 ATTENTION DEFICIT HYPERACTIVITY DISORDER (ADHD), COMBINED TYPE: ICD-10-CM

## 2024-08-23 DIAGNOSIS — Z00.00 ANNUAL PHYSICAL EXAM: Primary | ICD-10-CM

## 2024-08-23 PROCEDURE — 99395 PREV VISIT EST AGE 18-39: CPT | Performed by: INTERNAL MEDICINE

## 2024-08-23 NOTE — PROGRESS NOTES
"Assessment/Plan:    Attention deficit hyperactivity disorder (ADHD), combined type  Controlled on current dose of Adderall, no side effects reported.       Diagnoses and all orders for this visit:    Annual physical exam    Attention deficit hyperactivity disorder (ADHD), combined type          Subjective:      Patient ID: Rob Dong is a 34 y.o. male.    Patient came today for annual checkup.  Up-to-date on tetanus shot.  He is vital signs are acceptable.  He is physically active, he does not smoke.          The following portions of the patient's history were reviewed and updated as appropriate: allergies, current medications, past family history, past medical history, past social history, past surgical history, and problem list.    Review of Systems   Constitutional:  Negative for chills, fatigue and fever.   Respiratory:  Negative for cough, chest tightness and shortness of breath.    Cardiovascular:  Negative for chest pain, palpitations and leg swelling.   Gastrointestinal:  Negative for abdominal distention, abdominal pain, blood in stool, constipation, diarrhea and nausea.   Genitourinary:  Negative for difficulty urinating and dysuria.   Musculoskeletal:  Negative for arthralgias, back pain, gait problem, joint swelling, myalgias and neck pain.   Skin:  Negative for rash.   Neurological:  Negative for dizziness, weakness, numbness and headaches.   Psychiatric/Behavioral:  Negative for agitation.          Objective:      /74 (BP Location: Left arm, Patient Position: Sitting, Cuff Size: Large)   Pulse 70   Temp 98 °F (36.7 °C) (Tympanic)   Resp 18   Ht 5' 9\" (1.753 m)   Wt 91.6 kg (202 lb)   SpO2 99%   BMI 29.83 kg/m²     Allergies   Allergen Reactions    Cat Hair Extract Itching and Eye Swelling          Current Outpatient Medications:     amphetamine-dextroamphetamine (ADDERALL XR, 5MG,) 5 MG 24 hr capsule, Take 1 capsule (5 mg total) by mouth every morning Max Daily Amount: 5 mg, Disp: 30 " capsule, Rfl: 0    Cholecalciferol (VITAMIN D3 GUMMIES ADULT PO), Take 1 Units by mouth daily, Disp: , Rfl:      There are no Patient Instructions on file for this visit.        Physical Exam  Constitutional:       Appearance: He is not ill-appearing.   HENT:      Head: Normocephalic and atraumatic.      Nose: No congestion or rhinorrhea.   Eyes:      Pupils: Pupils are equal, round, and reactive to light.   Cardiovascular:      Rate and Rhythm: Normal rate and regular rhythm.      Pulses: Normal pulses.      Heart sounds: Normal heart sounds. No murmur heard.     No friction rub. No gallop.   Pulmonary:      Effort: Pulmonary effort is normal. No respiratory distress.      Breath sounds: Normal breath sounds. No wheezing or rales.   Chest:      Chest wall: No tenderness.   Abdominal:      General: Bowel sounds are normal. There is no distension.      Palpations: Abdomen is soft. There is no mass.      Tenderness: There is no abdominal tenderness. There is no rebound.      Hernia: No hernia is present.   Musculoskeletal:         General: No swelling, tenderness or deformity.      Cervical back: No rigidity. No muscular tenderness.   Skin:     Coloration: Skin is not pale.      Findings: No erythema, lesion or rash.   Neurological:      Mental Status: He is alert and oriented to person, place, and time.      Sensory: No sensory deficit.      Motor: No weakness.   Psychiatric:         Mood and Affect: Mood normal.         Behavior: Behavior normal.

## 2024-09-02 DIAGNOSIS — F90.2 ATTENTION DEFICIT HYPERACTIVITY DISORDER (ADHD), COMBINED TYPE: ICD-10-CM

## 2024-09-04 RX ORDER — DEXTROAMPHETAMINE SACCHARATE, AMPHETAMINE ASPARTATE MONOHYDRATE, DEXTROAMPHETAMINE SULFATE AND AMPHETAMINE SULFATE 1.25; 1.25; 1.25; 1.25 MG/1; MG/1; MG/1; MG/1
5 CAPSULE, EXTENDED RELEASE ORAL EVERY MORNING
Qty: 30 CAPSULE | Refills: 0 | Status: SHIPPED | OUTPATIENT
Start: 2024-09-04

## 2024-10-04 DIAGNOSIS — F90.2 ATTENTION DEFICIT HYPERACTIVITY DISORDER (ADHD), COMBINED TYPE: ICD-10-CM

## 2024-10-07 RX ORDER — DEXTROAMPHETAMINE SACCHARATE, AMPHETAMINE ASPARTATE MONOHYDRATE, DEXTROAMPHETAMINE SULFATE AND AMPHETAMINE SULFATE 1.25; 1.25; 1.25; 1.25 MG/1; MG/1; MG/1; MG/1
5 CAPSULE, EXTENDED RELEASE ORAL EVERY MORNING
Qty: 30 CAPSULE | Refills: 0 | Status: SHIPPED | OUTPATIENT
Start: 2024-10-07

## 2024-11-07 DIAGNOSIS — F90.2 ATTENTION DEFICIT HYPERACTIVITY DISORDER (ADHD), COMBINED TYPE: ICD-10-CM

## 2024-11-07 RX ORDER — DEXTROAMPHETAMINE SACCHARATE, AMPHETAMINE ASPARTATE MONOHYDRATE, DEXTROAMPHETAMINE SULFATE AND AMPHETAMINE SULFATE 1.25; 1.25; 1.25; 1.25 MG/1; MG/1; MG/1; MG/1
5 CAPSULE, EXTENDED RELEASE ORAL EVERY MORNING
Qty: 30 CAPSULE | Refills: 0 | Status: SHIPPED | OUTPATIENT
Start: 2024-11-07

## 2024-12-06 DIAGNOSIS — F90.2 ATTENTION DEFICIT HYPERACTIVITY DISORDER (ADHD), COMBINED TYPE: ICD-10-CM

## 2024-12-06 RX ORDER — DEXTROAMPHETAMINE SACCHARATE, AMPHETAMINE ASPARTATE MONOHYDRATE, DEXTROAMPHETAMINE SULFATE AND AMPHETAMINE SULFATE 1.25; 1.25; 1.25; 1.25 MG/1; MG/1; MG/1; MG/1
5 CAPSULE, EXTENDED RELEASE ORAL EVERY MORNING
Qty: 30 CAPSULE | Refills: 0 | Status: SHIPPED | OUTPATIENT
Start: 2024-12-06

## 2025-01-09 DIAGNOSIS — F90.2 ATTENTION DEFICIT HYPERACTIVITY DISORDER (ADHD), COMBINED TYPE: ICD-10-CM

## 2025-01-10 RX ORDER — DEXTROAMPHETAMINE SACCHARATE, AMPHETAMINE ASPARTATE MONOHYDRATE, DEXTROAMPHETAMINE SULFATE AND AMPHETAMINE SULFATE 1.25; 1.25; 1.25; 1.25 MG/1; MG/1; MG/1; MG/1
5 CAPSULE, EXTENDED RELEASE ORAL EVERY MORNING
Qty: 30 CAPSULE | Refills: 0 | Status: SHIPPED | OUTPATIENT
Start: 2025-01-10

## 2025-02-08 DIAGNOSIS — F90.2 ATTENTION DEFICIT HYPERACTIVITY DISORDER (ADHD), COMBINED TYPE: ICD-10-CM

## 2025-02-10 RX ORDER — DEXTROAMPHETAMINE SACCHARATE, AMPHETAMINE ASPARTATE MONOHYDRATE, DEXTROAMPHETAMINE SULFATE AND AMPHETAMINE SULFATE 1.25; 1.25; 1.25; 1.25 MG/1; MG/1; MG/1; MG/1
5 CAPSULE, EXTENDED RELEASE ORAL EVERY MORNING
Qty: 30 CAPSULE | Refills: 0 | Status: SHIPPED | OUTPATIENT
Start: 2025-02-10

## 2025-03-11 ENCOUNTER — OFFICE VISIT (OUTPATIENT)
Dept: FAMILY MEDICINE CLINIC | Facility: CLINIC | Age: 35
End: 2025-03-11
Payer: COMMERCIAL

## 2025-03-11 VITALS
BODY MASS INDEX: 30.51 KG/M2 | OXYGEN SATURATION: 97 % | HEART RATE: 101 BPM | HEIGHT: 69 IN | SYSTOLIC BLOOD PRESSURE: 126 MMHG | WEIGHT: 206 LBS | DIASTOLIC BLOOD PRESSURE: 74 MMHG

## 2025-03-11 DIAGNOSIS — F90.2 ATTENTION DEFICIT HYPERACTIVITY DISORDER (ADHD), COMBINED TYPE: ICD-10-CM

## 2025-03-11 PROCEDURE — 99214 OFFICE O/P EST MOD 30 MIN: CPT | Performed by: INTERNAL MEDICINE

## 2025-03-11 RX ORDER — DEXTROAMPHETAMINE SACCHARATE, AMPHETAMINE ASPARTATE MONOHYDRATE, DEXTROAMPHETAMINE SULFATE AND AMPHETAMINE SULFATE 1.25; 1.25; 1.25; 1.25 MG/1; MG/1; MG/1; MG/1
5 CAPSULE, EXTENDED RELEASE ORAL EVERY MORNING
Qty: 30 CAPSULE | Refills: 0 | Status: SHIPPED | OUTPATIENT
Start: 2025-03-11

## 2025-03-11 NOTE — PROGRESS NOTES
Name: Rob Dong      : 1990      MRN: [id number]  Encounter Provider: Prudencio Wood MD  Encounter Date: 3/11/2025   Encounter department: [facility]    Assessment & Plan  Attention deficit hyperactivity disorder (ADHD), combined type  Patient would like to continue current dose of Adderall, he denies any side effects.  Physical exam is normal.  Vital signs are good.  He will continue to take medication daily as needed, he will try to cut back on the frequency.  Follow-up in 6 months.    Orders:    amphetamine-dextroamphetamine (ADDERALL XR, 5MG,) 5 MG 24 hr capsule; Take 1 capsule (5 mg total) by mouth every morning Max Daily Amount: 5 mg         History of Present Illness     Patient came today for follow-up on his chronic medical problems including ADHD.      Review of Systems   Constitutional:  Negative for chills, fatigue and fever.   Respiratory:  Negative for cough, chest tightness and shortness of breath.    Cardiovascular:  Negative for chest pain, palpitations and leg swelling.   Genitourinary:  Negative for difficulty urinating and dysuria.   Musculoskeletal:  Negative for arthralgias.   Neurological:  Negative for dizziness, weakness, numbness and headaches.   Psychiatric/Behavioral:  Positive for decreased concentration. Negative for agitation and confusion.      History reviewed. No pertinent past medical history.  Past Surgical History:   Procedure Laterality Date    TONSILLECTOMY       Family History   Problem Relation Age of Onset    Breast cancer Maternal Grandmother      Social History     Tobacco Use    Smoking status: Never    Smokeless tobacco: Never   Vaping Use    Vaping status: Never Used   Substance and Sexual Activity    Alcohol use: Yes     Comment: occational    Drug use: No    Sexual activity: Yes     Partners: Female     Current Outpatient Medications on File Prior to Visit   Medication Sig    Cholecalciferol (VITAMIN D3 GUMMIES  "ADULT PO) Take 1 Units by mouth daily    [DISCONTINUED] amphetamine-dextroamphetamine (ADDERALL XR, 5MG,) 5 MG 24 hr capsule Take 1 capsule (5 mg total) by mouth every morning Max Daily Amount: 5 mg     Allergies   Allergen Reactions    Cat Dander Itching and Eye Swelling     Immunization History   Administered Date(s) Administered    COVID-19 PFIZER VACCINE 0.3 ML IM 04/06/2021, 04/27/2021, 01/23/2022    Influenza, injectable, quadrivalent, preservative free 0.5 mL 11/01/2022, 10/18/2023    Tdap 09/07/2022     Objective   /74   Pulse 101   Ht 5' 9\" (1.753 m)   Wt 93.4 kg (206 lb)   SpO2 97%   BMI 30.42 kg/m²     Physical Exam  Constitutional:       General: He is not in acute distress.     Appearance: He is not toxic-appearing.   Cardiovascular:      Heart sounds: No murmur heard.     No gallop.   Pulmonary:      Effort: No respiratory distress.      Breath sounds: No wheezing or rales.         "

## 2025-03-11 NOTE — ASSESSMENT & PLAN NOTE
Patient would like to continue current dose of Adderall, he denies any side effects.  Physical exam is normal.  Vital signs are good.  He will continue to take medication daily as needed, he will try to cut back on the frequency.  Follow-up in 6 months.    Orders:    amphetamine-dextroamphetamine (ADDERALL XR, 5MG,) 5 MG 24 hr capsule; Take 1 capsule (5 mg total) by mouth every morning Max Daily Amount: 5 mg